# Patient Record
Sex: MALE | Race: BLACK OR AFRICAN AMERICAN | NOT HISPANIC OR LATINO | Employment: FULL TIME | URBAN - METROPOLITAN AREA
[De-identification: names, ages, dates, MRNs, and addresses within clinical notes are randomized per-mention and may not be internally consistent; named-entity substitution may affect disease eponyms.]

---

## 2019-12-18 ENCOUNTER — APPOINTMENT (OUTPATIENT)
Dept: URGENT CARE | Facility: CLINIC | Age: 49
End: 2019-12-18
Payer: MEDICAID

## 2020-11-27 ENCOUNTER — HOSPITAL ENCOUNTER (EMERGENCY)
Facility: MEDICAL CENTER | Age: 50
End: 2020-11-27
Attending: EMERGENCY MEDICINE
Payer: COMMERCIAL

## 2020-11-27 ENCOUNTER — APPOINTMENT (OUTPATIENT)
Dept: RADIOLOGY | Facility: MEDICAL CENTER | Age: 50
End: 2020-11-27
Attending: EMERGENCY MEDICINE
Payer: COMMERCIAL

## 2020-11-27 ENCOUNTER — NON-PROVIDER VISIT (OUTPATIENT)
Dept: OCCUPATIONAL MEDICINE | Facility: CLINIC | Age: 50
End: 2020-11-27
Payer: COMMERCIAL

## 2020-11-27 VITALS
HEIGHT: 71 IN | DIASTOLIC BLOOD PRESSURE: 104 MMHG | TEMPERATURE: 96.8 F | HEART RATE: 70 BPM | OXYGEN SATURATION: 98 % | BODY MASS INDEX: 31.48 KG/M2 | SYSTOLIC BLOOD PRESSURE: 159 MMHG | WEIGHT: 224.87 LBS | RESPIRATION RATE: 18 BRPM

## 2020-11-27 DIAGNOSIS — Z02.83 ENCOUNTER FOR DRUG SCREENING: ICD-10-CM

## 2020-11-27 DIAGNOSIS — S60.222A CONTUSION OF LEFT HAND, INITIAL ENCOUNTER: ICD-10-CM

## 2020-11-27 LAB
AMP AMPHETAMINE: NORMAL
BAR BARBITURATES: NORMAL
BREATH ALCOHOL COMMENT: NORMAL
BZO BENZODIAZEPINES: NORMAL
COC COCAINE: NORMAL
INT CON NEG: NORMAL
INT CON POS: NORMAL
MDMA ECSTASY: NORMAL
MET METHAMPHETAMINES: NORMAL
MTD METHADONE: NORMAL
OPI OPIATES: NORMAL
OXY OXYCODONE: NORMAL
PCP PHENCYCLIDINE: NORMAL
POC BREATHALIZER: 0 PERCENT (ref 0–0.01)
POC URINE DRUG SCREEN OCDRS: NORMAL
THC: NORMAL

## 2020-11-27 PROCEDURE — 80305 DRUG TEST PRSMV DIR OPT OBS: CPT | Performed by: PREVENTIVE MEDICINE

## 2020-11-27 PROCEDURE — 73130 X-RAY EXAM OF HAND: CPT | Mod: LT

## 2020-11-27 PROCEDURE — 99283 EMERGENCY DEPT VISIT LOW MDM: CPT

## 2020-11-27 PROCEDURE — 8899 PR URINE 11 PANEL - AFTER HOURS: Performed by: PREVENTIVE MEDICINE

## 2020-11-27 PROCEDURE — 82075 ASSAY OF BREATH ETHANOL: CPT | Performed by: PREVENTIVE MEDICINE

## 2020-11-27 ASSESSMENT — PAIN DESCRIPTION - DESCRIPTORS: DESCRIPTORS: ACHING

## 2020-11-27 NOTE — ED PROVIDER NOTES
ED Provider Note    CHIEF COMPLAINT  Chief Complaint   Patient presents with   • Hand Pain       HPI  Cuate Bravo is a 50 y.o. male who presents with complaint of left hand pain.  The patient states he works at a job where he is utilizing his hand and pushing mechanical objects through a hole and caught his left hand stuck between the mechanical tool and the hole resulting in him hitting his hand.  This happened 2 days ago.  Since that time is increasing pain to his index finger as well as thenar eminence on his left hand.  Denies loss of sensation or strength of his hand.  Seen today by his occupational health provider at the facility and was told to come to the emergency department for evaluation.  He is a right-hand-dominant male.  REVIEW OF SYSTEMS  Pertinent positives include left hand pain  Pertinent negatives include loss of sensation or strength to left upper extremity, left hand  PAST MEDICAL HISTORY  History reviewed. No pertinent past medical history.    FAMILY HISTORY  Family History   Problem Relation Age of Onset   • Other Mother         unknown   • Seizures Father        SOCIAL HISTORY  Social History     Socioeconomic History   • Marital status:      Spouse name: Not on file   • Number of children: Not on file   • Years of education: Not on file   • Highest education level: Not on file   Occupational History   • Not on file   Social Needs   • Financial resource strain: Not on file   • Food insecurity     Worry: Not on file     Inability: Not on file   • Transportation needs     Medical: Not on file     Non-medical: Not on file   Tobacco Use   • Smoking status: Current Every Day Smoker     Packs/day: 0.25     Types: Cigarettes   • Smokeless tobacco: Current User   Substance and Sexual Activity   • Alcohol use: Yes     Alcohol/week: 6.0 oz     Types: 10 Shots of liquor per week     Comment: Occasionally   • Drug use: No   • Sexual activity: Yes     Partners: Female   Lifestyle   •  "Physical activity     Days per week: Not on file     Minutes per session: Not on file   • Stress: Not on file   Relationships   • Social connections     Talks on phone: Not on file     Gets together: Not on file     Attends Buddhism service: Not on file     Active member of club or organization: Not on file     Attends meetings of clubs or organizations: Not on file     Relationship status: Not on file   • Intimate partner violence     Fear of current or ex partner: Not on file     Emotionally abused: Not on file     Physically abused: Not on file     Forced sexual activity: Not on file   Other Topics Concern   • Not on file   Social History Narrative   • Not on file       SURGICAL HISTORY  History reviewed. No pertinent surgical history.    CURRENT MEDICATIONS  Home Medications     Reviewed by Babatunde Ordoñez (Pharmacy Tech) on 11/27/20 at 0849  Med List Status: Complete   Medication Last Dose Status        Patient Omega Taking any Medications                       ALLERGIES  No Known Allergies    PHYSICAL EXAM  VITAL SIGNS: /104   Pulse 70   Temp 36 °C (96.8 °F) (Temporal)   Resp 18   Ht 1.803 m (5' 11\")   Wt 102 kg (224 lb 13.9 oz)   SpO2 98%   BMI 31.36 kg/m²      Constitutional: Well developed, Well nourished, No acute distress, Non-toxic appearance.   Skin: Warm, Dry, No erythema, No rash.   Extremities: Tenderness to the lateral aspect of the extensor surface of the left index finger in the proximal phalanx, slight thenar eminence tenderness in the left upper extremity, cap refill is less than 2 seconds to left upper extremity  Neurologic: Ulnar, median and radial nerve intact sensation strength left upper extremity      RADIOLOGY/PROCEDURES  DX-HAND 3+ LEFT   Final Result      1.  There is no acute displaced fracture of the left hand.            COURSE & MEDICAL DECISION MAKING  Pertinent Labs & Imaging studies reviewed. (See chart for details)  This is a pleasant 50-year-old male " presents with a left hand contusion.  X-rays negative for fracture.  This point cannot complete exclude occult fracture therefore is to follow-up with his Workmen's Compensation for further evaluation.  Admission the patient is to follow-up with Workmen's Compensation for clearance to go back to work.  The patient was discharged with strict return precautions utilize ice, ibuprofen and Aminofen for pain control.      FINAL IMPRESSION     1. Contusion of left hand, initial encounter Active       DISPOSITION:  Patient will be discharged home in stable condition.    FOLLOW UP:  [facility], Emergency Dept  [address]  [facility] [address]  [phone]    If symptoms worsen    [facility] [address]  [address]  [facility] [address]  [phone]  Schedule an appointment as soon as possible for a visit         Electronically signed by: Ander Dumas D.O., 11/27/2020 8:51 AM

## 2020-11-27 NOTE — LETTER
"  FORM C-4:  EMPLOYEE’S CLAIM FOR COMPENSATION/ REPORT OF INITIAL TREATMENT  EMPLOYEE’S CLAIM - PROVIDE ALL INFORMATION REQUESTED   First Name Cuate Last Name Lawrence Birthdate 1970  Sex male Claim Number   Home Address [address]   [facility]             [place] [address]                                   Age  50 y.o. Height  1.803 m (5' 11\") Weight  102 kg (224 lb 13.9 oz) [facility]     Mailing Address [address]  [facility]              [place] [address] Telephone  [phone] (home)  Primary Language Spoken   Insurer  [employer]   [employer] Employee's Occupation (Job Title) When Injury or Occupational Disease Occurred  I+   Employer's Name [employer] [facility] Telephone [phone]    Employer Address [address] [facility] [address]] Zip [address]   Date of Injury  11/26/2020       Hour of Injury  6:30 PM Date Employer Notified  11/26/2020 Last Day of Work after Injury or Occupational Disease  11/27/2020 Supervisor to Whom Injury Reported  Rai   Address or Location of Accident (if applicable) [[facility]]   What were you doing at the time of accident? (if applicable) working on a machine    How did this injury or occupational disease occur? Be specific and answer in detail. Use additional sheet if necessary)  I was working on a machine at [employer] I was pulling material through the machine when I heard a pop then I had pain come therw my hand   If you believe that you have an occupational disease, when did you first have knowledge of the disability and it relationship to your employment? No Witnesses to the Accident  No   Nature of Injury or Occupational Disease  Workers' Compensation Part(s) of Body Injured or Affected  Hand (L), N/A, N/A    I CERTIFY THAT THE ABOVE IS TRUE AND CORRECT TO THE BEST OF MY KNOWLEDGE AND THAT I HAVE PROVIDED THIS INFORMATION IN ORDER TO OBTAIN THE BENEFITS OF [place]).  I HEREBY AUTHORIZE ANY PHYSICIAN, CHIROPRACTOR, SURGEON, PRACTITIONER, OR OTHER PERSON, ANY HOSPITAL, INCLUDING [facility] OR [facility], ANY MEDICAL SERVICE ORGANIZATION, ANY INSURANCE COMPANY, OR OTHER INSTITUTION OR ORGANIZATION TO RELEASE TO EACH OTHER, ANY MEDICAL OR OTHER INFORMATION, INCLUDING BENEFITS PAID OR PAYABLE, PERTINENT TO THIS INJURY OR DISEASE, EXCEPT INFORMATION RELATIVE TO DIAGNOSIS, TREATMENT AND/OR COUNSELING FOR AIDS, PSYCHOLOGICAL CONDITIONS, ALCOHOL OR CONTROLLED SUBSTANCES, FOR WHICH I MUST GIVE SPECIFIC AUTHORIZATION.  A PHOTOSTAT OF THIS AUTHORIZATION SHALL BE AS VALID AS THE ORIGINAL.  Date 11/27/2020       [facility]                Employee’s Signature   THIS REPORT MUST BE COMPLETED AND MAILED WITHIN 3 WORKING DAYS OF TREATMENT   [facility], EMERGENCY DEPT                       Name of Facility [facility]   Date  11/27/2020 Diagnosis  (S60.222A) Contusion of left hand, initial encounter, Active Is there evidence the injured employee was under the influence of alcohol and/or another controlled substance at the time of accident?   Hour  9:53 AM Description of Injury or Disease  Contusion of left hand, initial encounter No   Treatment  Ibuprofen and Tylenol for pain ice as needed  Have you advised the patient to remain off work five days or more?         No   X-Ray Findings  Negative  Comments:Negative hand x-ray If Yes   From Date    To Date      From information given by the employee, together with medical evidence, can you directly connect this injury or occupational disease as job incurred? Yes If No, is employee capable of: Full Duty  No Modified Duty  Yes   Is additional medical care by a physician indicated? Yes  Comments:Follow-up for reevaluation If Modified Duty, Specify any  "Limitations / Restrictions   No use of left hand   Do you know of any previous injury or disease contributing to this condition or occupational disease? No    Date 11/27/2020 Print Doctor’s Name Ramona Dumas certify the employer’s copy of this form was mailed on:   Address [address] [address] [phone] INSURER’S USE ONLY   Provider’s Tax ID Number [id number] Telephone Dept: [phone]    Doctor’s Signature regina-RAMONA Red D.O. Degree  DO      Form C-4 (rev.10/07)                                                                         BRIEF DESCRIPTION OF RIGHTS AND BENEFITS  (Pursuant to [place] 616C.050)    Notice of Injury or Occupational Disease (Incident Report Form C-1): If an injury or occupational disease (OD) arises out of and in the course of employment, you must provide written notice to your employer as soon as practicable, but no later than 7 days after the accident or OD. Your employer shall maintain a sufficient supply of the required forms.    Claim for Compensation (Form C-4): If medical treatment is sought, the form C-4 is available at the place of initial treatment. A completed \"Claim for Compensation\" (Form C-4) must be filed within 90 days after an accident or OD. The treating physician or chiropractor must, within 3 working days after treatment, complete and mail to the employer, the employer's insurer and third-party , the Claim for Compensation.    Medical Treatment: If you require medical treatment for your on-the-job injury or OD, you may be required to select a physician or chiropractor from a list provided by your workers’ compensation insurer, if it has contracted with an Organization for Managed Care (MCO) or Preferred Provider Organization (PPO) or providers of health care. If your employer has not entered into a contract with an MCO or PPO, you may select a physician or chiropractor from the Panel of Physicians and Chiropractors. " Any medical costs related to your industrial injury or OD will be paid by your insurer.    Temporary Total Disability (TTD): If your doctor has certified that you are unable to work for a period of at least 5 consecutive days, or 5 cumulative days in a 20-day period, or places restrictions on you that your employer does not accommodate, you may be entitled to TTD compensation.    Temporary Partial Disability (TPD): If the wage you receive upon reemployment is less than the compensation for TTD to which you are entitled, the insurer may be required to pay you TPD compensation to make up the difference. TPD can only be paid for a maximum of 24 months.    Permanent Partial Disability (PPD): When your medical condition is stable and there is an indication of a PPD as a result of your injury or OD, within 30 days, your insurer must arrange for an evaluation by a rating physician or chiropractor to determine the degree of your PPD. The amount of your PPD award depends on the date of injury, the results of the PPD evaluation and your age and wage.    Permanent Total Disability (PTD): If you are medically certified by a treating physician or chiropractor as permanently and totally disabled and have been granted a PTD status by your insurer, you are entitled to receive monthly benefits not to exceed 66 2/3% of your average monthly wage. The amount of your PTD payments is subject to reduction if you previously received a PPD award.    Vocational Rehabilitation Services: You may be eligible for vocational rehabilitation services if you are unable to return to the job due to a permanent physical impairment or permanent restrictions as a result of your injury or occupational disease.    Transportation and Per Saurabh Reimbursement: You may be eligible for travel expenses and per saurabh associated with medical treatment.    Reopening: You may be able to reopen your claim if your condition worsens after claim closure.     Appeal  Process: If you disagree with a written determination issued by the insurer or the insurer does not respond to your request, you may appeal to the Department of Administration, , by following the instructions contained in your determination letter. You must appeal the determination within 70 days from the date of the determination letter at [address], [address], [place], [place] [address], or [address], [address], [place], [place] [address]. If you disagree with the  decision, you may appeal to the Department of Administration, . You must file your appeal within 30 days from the date of the  decision letter at [address], [address], [place], [place] [address], or [address], [address], [place], [place] [address]. If you disagree with a decision of an , you may file a petition for judicial review with the District Court. You must do so within 30 days of the Appeal Officer’s decision. You may be represented by an  at your own expense or you may contact the [facility] for possible representation.    [place]  for Injured Workers (NAIW): If you disagree with a  decision, you may request that NAIW represent you without charge at an  Hearing. For information regarding denial of benefits, you may contact the [facility] at: [address], [address], [place] [address], [phone], or [address], [address], [place] [address], [phone]    To File a Complaint with the Division: If you wish to file a complaint with the  of the Division of Industrial Relations (DIR),  please contact the Workers’ Compensation Section, [address] [facility] [place] [address], [place], [place] [address], telephone (732) [phone], or [address], [address], [place], [place] [address], telephone [phone].    For assistance with Workers’ Compensation  Issues: You may contact the Office of the Governor Consumer Health Assistance, [address] [place] [address], [place] [address], [place] [address], Toll Free 1-704.403.7151, Web site: http://govcha.[facility].nv., E-mail jennifer@[facility].nv.  D-2 (rev. 06/18)              __________________________________________________________________                                    __11/27/2020____            Employee Name / Signature                                                                                                                            Date

## 2020-11-27 NOTE — ED TRIAGE NOTES
"Pt presents complaining of left hand pain.  This is a work related injured incurred 2 days ago.  He states \"crushing\" his left hand in a winding machine.   Chief Complaint   Patient presents with   • Hand Pain     /104   Pulse 70   Temp 36 °C (96.8 °F) (Temporal)   Resp 18   Ht 1.803 m (5' 11\")   Wt 102 kg (224 lb 13.9 oz)   SpO2 98%   BMI 31.36 kg/m²       "

## 2020-11-27 NOTE — ED NOTES
ERP at bedside. Pt agrees with plan of care discussed by ERP. AIDET acknowledged with patient. Raysa in low position, side rail up for pt safety. Call light within reach. Will continue to monitor.

## 2020-11-27 NOTE — LETTER
"  FORM C-4:  EMPLOYEE’S CLAIM FOR COMPENSATION/ REPORT OF INITIAL TREATMENT  EMPLOYEE’S CLAIM - PROVIDE ALL INFORMATION REQUESTED   First Name Cuate Last Name Lawrence Birthdate 1970  Sex male Claim Number   Home Address [address]   [facility]             [place] [address]                                   Age  50 y.o. Height  1.803 m (5' 11\") Weight  102 kg (224 lb 13.9 oz) [facility]  [id number]   Mailing Address [address]  [facility]              Zip [address] Telephone  [phone] (home)  Primary Language Spoken   Insurer  *** Third Party   [employer] Employee's Occupation (Job Title) When Injury or Occupational Disease Occurred     Employer's Name [employer] Telephone [phone]    Employer Address [address] [employer] [facility] [29] Zip [address]   Date of Injury  11/26/2020       Hour of Injury  6:30 PM Date Employer Notified  11/26/2020 Last Day of Work after Injury or Occupational Disease  11/27/2020 Supervisor to Whom Injury Reported  Rai   Address or Location of Accident (if applicable) [[facility]]   What were you doing at the time of accident? (if applicable) working on a machine    How did this injury or occupational disease occur? Be specific and answer in detail. Use additional sheet if necessary)  I was working on a machine at [employer] I was pulling material through the machine when I heard a pop then I had pain come therw my hand   If you believe that you have an occupational disease, when did you first have knowledge of the disability and it relationship to your employment? No Witnesses to the Accident  No   Nature of Injury or Occupational Disease  Workers' Compensation Part(s) of Body Injured or Affected  Hand (L), N/A, N/A    I CERTIFY THAT THE ABOVE IS TRUE AND CORRECT TO THE BEST OF MY KNOWLEDGE AND THAT I HAVE PROVIDED THIS INFORMATION IN ORDER TO OBTAIN THE BENEFITS OF [place]CC[place] IN[place]IVE OR CHAPTER 617 OF [place]).  I HEREBY AUTHORIZE ANY PHYSICIAN, CHIROPRACTOR, SURGEON, PRACTITIONER, OR OTHER PERSON, ANY HOSPITAL, INCLUDING [facility] OR [facility], ANY MEDICAL SERVICE ORGANIZATION, ANY INSURANCE COMPANY, OR OTHER INSTITUTION OR ORGANIZATION TO RELEASE TO EACH OTHER, ANY MEDICAL OR OTHER INFORMATION, INCLUDING BENEFITS PAID OR PAYABLE, PERTINENT TO THIS INJURY OR DISEASE, EXCEPT INFORMATION RELATIVE TO DIAGNOSIS, TREATMENT AND/OR COUNSELING FOR AIDS, PSYCHOLOGICAL CONDITIONS, ALCOHOL OR CONTROLLED SUBSTANCES, FOR WHICH I MUST GIVE SPECIFIC AUTHORIZATION.  A PHOTOSTAT OF THIS AUTHORIZATION SHALL BE AS VALID AS THE ORIGINAL.  Date                                      Place                                                                             Employee’s Signature   THIS REPORT MUST BE COMPLETED AND MAILED WITHIN 3 WORKING DAYS OF TREATMENT   Place [facility], EMERGENCY DEPT                       Name of Facility [facility]   Date  11/27/2020 Diagnosis  (S60.222A) Contusion of left hand, initial encounter, Active Is there evidence the injured employee was under the influence of alcohol and/or another controlled substance at the time of accident?   Hour  9:44 AM Description of Injury or Disease  Contusion of left hand, initial encounter     Treatment     Have you advised the patient to remain off work five days or more?             X-Ray Findings    If Yes   From Date    To Date      From information given by the employee, together with medical evidence, can you directly connect this injury or occupational disease as job incurred?   If No, is employee capable of: Full Duty    Modified Duty      Is additional medical care by a physician indicated?   If Modified Duty, Specify any Limitations / Restrictions       Do you know of any previous injury or disease  "contributing to this condition or occupational disease?      Date 11/27/2020 Print Doctor’s Name Alesia Ander APARICIO certify the employer’s copy of this form was mailed on:   Address [address] [id number] [phone] INSURER’S USE ONLY   Provider’s Tax ID Number [id number] Telephone Dept: [phone]    Doctor’s Signature regina-ANDER Red D.O. Degree  DO      Form C-4 (rev.10/07)                                                                         BRIEF DESCRIPTION OF RIGHTS AND BENEFITS  (Pursuant to [place]RS 616C.050)    Notice of Injury or Occupational Disease (Incident Report Form C-1): If an injury or occupational disease (OD) arises out of and in the course of employment, you must provide written notice to your employer as soon as practicable, but no later than 7 days after the accident or OD. Your employer shall maintain a sufficient supply of the required forms.    Claim for Compensation (Form C-4): If medical treatment is sought, the form C-4 is available at the place of initial treatment. A completed \"Claim for Compensation\" (Form C-4) must be filed within 90 days after an accident or OD. The treating physician or chiropractor must, within 3 working days after treatment, complete and mail to the employer, the employer's insurer and third-party , the Claim for Compensation.    Medical Treatment: If you require medical treatment for your on-the-job injury or OD, you may be required to select a physician or chiropractor from a list provided by your workers’ compensation insurer, if it has contracted with an Organization for Managed Care (MCO) or Preferred Provider Organization (PPO) or providers of health care. If your employer has not entered into a contract with an MCO or PPO, you may select a physician or chiropractor from the Panel of Physicians and Chiropractors. Any medical costs related to your industrial injury or OD will be paid by your " insurer.    Temporary Total Disability (TTD): If your doctor has certified that you are unable to work for a period of at least 5 consecutive days, or 5 cumulative days in a 20-day period, or places restrictions on you that your employer does not accommodate, you may be entitled to TTD compensation.    Temporary Partial Disability (TPD): If the wage you receive upon reemployment is less than the compensation for TTD to which you are entitled, the insurer may be required to pay you TPD compensation to make up the difference. TPD can only be paid for a maximum of 24 months.    Permanent Partial Disability (PPD): When your medical condition is stable and there is an indication of a PPD as a result of your injury or OD, within 30 days, your insurer must arrange for an evaluation by a rating physician or chiropractor to determine the degree of your PPD. The amount of your PPD award depends on the date of injury, the results of the PPD evaluation and your age and wage.    Permanent Total Disability (PTD): If you are medically certified by a treating physician or chiropractor as permanently and totally disabled and have been granted a PTD status by your insurer, you are entitled to receive monthly benefits not to exceed 66 2/3% of your average monthly wage. The amount of your PTD payments is subject to reduction if you previously received a PPD award.    Vocational Rehabilitation Services: You may be eligible for vocational rehabilitation services if you are unable to return to the job due to a permanent physical impairment or permanent restrictions as a result of your injury or occupational disease.    Transportation and Per Saurabh Reimbursement: You may be eligible for travel expenses and per saurabh associated with medical treatment.    Reopening: You may be able to reopen your claim if your condition worsens after claim closure.     Appeal Process: If you disagree with a written determination issued by the insurer or the  insurer does not respond to your request, you may appeal to the Department of Administration, , by following the instructions contained in your determination letter. You must appeal the determination within 70 days from the date of the determination letter at [address], [address], [place], [place] [address], or [address], [address], [place], [place] [address]. If you disagree with the  decision, you may appeal to the Department of Administration, . You must file your appeal within 30 days from the date of the  decision letter at [address], [address], [place], [place] [address], or [address], [address], [place], [place] [address]. If you disagree with a decision of an , you may file a petition for judicial review with the District Court. You must do so within 30 days of the Appeal Officer’s decision. You may be represented by an  at your own expense or you may contact the [facility] for possible representation.    [place]  for Injured Workers (NAIW): If you disagree with a  decision, you may request that NAIW represent you without charge at an  Hearing. For information regarding denial of benefits, you may contact the [facility] at: [address], [address], [place] [address], [phone]) [phone], or [address], [address], [place] [address], [phone]    To File a Complaint with the Division: If you wish to file a complaint with the  of the Division of Industrial Relations (DIR),  please contact the Workers’ Compensation Section, [address], [address], [place], [place] [address], telephone (338) [phone], or [address], [facility] [address], [place], [place] [address], telephone [phone]) [phone].    For assistance with Workers’ Compensation Issues: You may contact the Office of the Governor Consumer Health Assistance, [address]  [facility], [place], [place] [address], Toll Free 1-947.315.3460, Web site: http://[email or web address]., E-mail [email or web address].  D-2 (rev. 06/18)              __________________________________________________________________                                    _________________            Employee Name / Signature                                                                                                                            Date

## 2020-12-02 ENCOUNTER — OCCUPATIONAL MEDICINE (OUTPATIENT)
Dept: OCCUPATIONAL MEDICINE | Facility: CLINIC | Age: 50
End: 2020-12-02
Payer: COMMERCIAL

## 2020-12-02 VITALS
HEART RATE: 80 BPM | DIASTOLIC BLOOD PRESSURE: 86 MMHG | BODY MASS INDEX: 32.2 KG/M2 | HEIGHT: 71 IN | TEMPERATURE: 99.1 F | WEIGHT: 230 LBS | SYSTOLIC BLOOD PRESSURE: 122 MMHG | RESPIRATION RATE: 14 BRPM | OXYGEN SATURATION: 98 %

## 2020-12-02 DIAGNOSIS — S60.222D CONTUSION OF LEFT HAND, SUBSEQUENT ENCOUNTER: ICD-10-CM

## 2020-12-02 DIAGNOSIS — M20.002 DEVIATION OF FINGER OF LEFT HAND: ICD-10-CM

## 2020-12-02 PROCEDURE — 99213 OFFICE O/P EST LOW 20 MIN: CPT | Performed by: NURSE PRACTITIONER

## 2020-12-02 ASSESSMENT — PAIN SCALES - GENERAL: PAINLEVEL: 8=MODERATE-SEVERE PAIN

## 2020-12-02 ASSESSMENT — ENCOUNTER SYMPTOMS
MYALGIAS: 1
CARDIOVASCULAR NEGATIVE: 1
RESPIRATORY NEGATIVE: 1
CONSTITUTIONAL NEGATIVE: 1
PSYCHIATRIC NEGATIVE: 1
NEUROLOGICAL NEGATIVE: 1

## 2020-12-02 NOTE — PROGRESS NOTES
"Subjective:      Cuate Bravo is a 50 y.o. male who presents with Follow-Up ( DOI 11/26/2020 - Hand - Same - RM 17)      Cuate Bravo is a 50 y.o. male who presents with complaint of left hand pain.  The patient states he works at a job where he is utilizing his hand and pushing mechanical objects through a hole and caught his left hand stuck between the mechanical tool and the hole resulting in him hitting his hand.  He reports no improvement of symptoms.  He states that pain is radiating, constant with any gripping, and achy in nature.  He states that he does have some swelling at the end of the day.  He denies numbness, tingling, or overall decreased strength.  He has been taking aspirin with mild to moderate relief of symptoms.  He notes a previous injury in the same digit lower back, which took a very long time to heal.  He has not been back to work since the incident.  Due to the nature of injury feel is warranted patient follow-up with orthopedic hand surgeon for further evaluation and management.  Will also place order for hand therapy at this visit.  Plan of care discussed with patient.     HPI    Review of Systems   Constitutional: Negative.    Respiratory: Negative.    Cardiovascular: Negative.    Musculoskeletal: Positive for joint pain and myalgias.   Skin: Negative.    Neurological: Negative.    Psychiatric/Behavioral: Negative.         ROS: All systems were reviewed on intake form, form was reviewed and signed. See scanned documents in media. Pertinent positives and negatives included in HPI.    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     Appearance: Normal appearance. He is not ill-appearing.   Cardiovascular:      Rate and Rhythm: Normal rate and regular rhythm.      Pulses: Normal pulses.   Pulmonary:      Effort: Pulmonary effort is normal.   Musculoskeletal: Normal range of motion.         General: Tenderness, deformity and signs of injury present. No swelling.   Skin:     General: Skin is warm and dry.      Capillary Refill: Capillary refill takes less than 2 seconds.      Findings: No bruising or erythema.   Neurological:      General: No focal deficit present.      Mental Status: He is alert and oriented to person, place, and time.      Cranial Nerves: No cranial nerve deficit.      Sensory: No sensory deficit.      Motor: Weakness present.      Gait: Gait normal.   Psychiatric:         Mood and Affect: Mood normal.         Behavior: Behavior normal.         Left hand: Positive moderate tenderness to the lateral aspect of the extensor surface of the left index finger in the proximal phalanx, slight thenar eminence tenderness in the left upper extremity, cap refill is less than 2 seconds to left upper extremity.  Negative edema, erythema, or severe warmth noted at the joint.   strength 3/5.  Distal neurovascular sensation intact.       Assessment/Plan:        1. Contusion of left hand, subsequent encounter    - REFERRAL TO HAND SURGERY  - REFERRAL TO OCCUPATIONAL THERAPY    2. Deviation of finger of left hand    Follow-up in 3 weeks, if not seen by hand surgery   Restricted duty, per hand surgery   Hand surgery referral placed, transfer care   Hand therapy referral placed   Continue with aspirin as needed for symptoms   Continue with ice and elevation as needed for swelling   Continue with gentle range of motion and stretching exercises as tolerated

## 2020-12-02 NOTE — LETTER
[address],   [address] [place] [address]  Phone:  [phone] - Fax:  [phone]   [facility] Progress Report and Disability Certification  Date of Service: 12/2/2020   No Show:  No  Date / Time of Next Visit:   Discharged / Care Transfer to Hand Surgery    Claim Information   Patient Name: Cuate Bravo  Claim Number:     Employer: [employer]  Date of Injury: 11/26/2020     Insurer / TPA: [employer]  ID / SSN:     Occupation: I+  Diagnosis: The encounter diagnosis was Contusion of left hand, subsequent encounter.    Medical Information   Related to Industrial Injury? Yes    Subjective Complaints:  Cuate Bravo is a 50 y.o. male who presents with complaint of left hand pain.  The patient states he works at a job where he is utilizing his hand and pushing mechanical objects through a hole and caught his left hand stuck between the mechanical tool and the hole resulting in him hitting his hand.  He reports no improvement of symptoms.  He states that pain is radiating, constant with any gripping, and achy in nature.  He states that he does have some swelling at the end of the day.  He denies numbness, tingling, or overall decreased strength.  He has been taking aspirin with mild to moderate relief of symptoms.  He notes a previous injury in the same digit lower back, which took a very long time to heal.  He has not been back to work since the incident.  Due to the nature of injury feel is warranted patient follow-up with orthopedic hand surgeon for further evaluation and management.  Will also place order for hand therapy at this visit.  Plan of care discussed with patient.   Objective Findings: Left hand: Positive moderate tenderness to the lateral aspect of the extensor surface of the left index finger in the proximal phalanx, slight thenar eminence tenderness in the left upper  extremity, cap refill is less than 2 seconds to left upper extremity.  Negative edema, erythema, or severe warmth noted at the joint.   strength 3/5.  Distal neurovascular sensation intact.   Pre-Existing Condition(s):     Assessment:   Condition Same    Status: Discharged / Care Transfer  Permanent Disability:No    Plan: OTTransfer Care    Diagnostics:      Comments:  Follow-up in 3 weeks, if not seen by hand surgery  Restricted duty, per hand surgery  Hand surgery referral placed, transfer care  Hand therapy referral placed  Continue with aspirin as needed for symptoms  Continue with ice and elevation as needed f  or swelling  Continue with gentle range of motion and stretching exercises as tolerated    Disability Information   Status: Released to Restricted Duty    From:  12/2/2020  Through:   Restrictions are: Temporary   Physical Restrictions   Sitting:    Standing:    Stooping:    Bending:      Squatting:    Walking:    Climbing:    Pushing:      Pulling:    Other:    Reaching Above Shoulder (L):   Reaching Above Shoulder (R):       Reaching Below Shoulder (L):    Reaching Below Shoulder (R):      Not to exceed Weight Limits   Carrying(hrs):   Weight Limit(lb): < or = to 25 pounds  Comments:Left hand only  Lifting(hrs):   Weight  Limit(lb): < or = to 25 pounds  Comments:Left hand only    Comments:      Repetitive Actions   Hands: i.e. Fine Manipulations from Grasping: < or = to 2 hrs/day   Feet: i.e. Operating Foot Controls:     Driving / Operate Machinery:     Provider Name:   ALFREDO Monte Physician Signature:  Physician Name:     Clinic Name / Location: [address] [place] [address] Clinic Phone Number: Dept: [phone]   Appointment Time: 8:45 Am Visit Start Time: 8:30 AM   Check-In Time:  8:13 Am Visit Discharge Time: 9:12 am    Original-Treating Physician or Chiropractor    Page 2-Insurer/TPA    Page 3-Employer    Page 4-Employee

## 2022-08-11 ENCOUNTER — OCCUPATIONAL MEDICINE (OUTPATIENT)
Dept: OCCUPATIONAL MEDICINE | Facility: CLINIC | Age: 52
End: 2022-08-11
Payer: COMMERCIAL

## 2022-08-11 VITALS
BODY MASS INDEX: 27.86 KG/M2 | HEIGHT: 71 IN | HEART RATE: 94 BPM | SYSTOLIC BLOOD PRESSURE: 168 MMHG | TEMPERATURE: 98.3 F | DIASTOLIC BLOOD PRESSURE: 90 MMHG | WEIGHT: 199 LBS | OXYGEN SATURATION: 98 %

## 2022-08-11 DIAGNOSIS — M54.16 RADICULOPATHY OF LUMBAR REGION: ICD-10-CM

## 2022-08-11 DIAGNOSIS — S39.012D STRAIN OF LUMBAR REGION, SUBSEQUENT ENCOUNTER: ICD-10-CM

## 2022-08-11 PROCEDURE — 99213 OFFICE O/P EST LOW 20 MIN: CPT | Performed by: NURSE PRACTITIONER

## 2022-08-11 RX ORDER — NAPROXEN 375 MG/1
TABLET ORAL
COMMUNITY
Start: 2022-07-29 | End: 2022-09-09 | Stop reason: SDUPTHER

## 2022-08-11 RX ORDER — OXYCODONE HYDROCHLORIDE AND ACETAMINOPHEN 5; 325 MG/1; MG/1
TABLET ORAL
COMMUNITY
Start: 2022-07-29 | End: 2023-06-28

## 2022-08-11 RX ORDER — OMEPRAZOLE 40 MG/1
CAPSULE, DELAYED RELEASE ORAL
COMMUNITY
Start: 2022-07-29 | End: 2023-06-28

## 2022-08-11 RX ORDER — METHOCARBAMOL 750 MG/1
TABLET, FILM COATED ORAL
COMMUNITY
Start: 2022-07-29 | End: 2022-09-09 | Stop reason: SDUPTHER

## 2022-08-11 NOTE — LETTER
[address],   [address] [place] [address]  Phone:  [phone] - Fax:  [phone]   Occupational [facility] Progress Report and Disability Certification  Date of Service: 8/11/2022   No Show:  No  Date / Time of Next Visit: 8/19/2022 @10:15am   Claim Information   Patient Name: Cuate Bravo  Claim Number:     Employer: [employer]  Date of Injury: 7/28/2022     Insurer / TPA:   JESUS ID / SSN:     Occupation:   MAINTANCE Diagnosis: Diagnoses of Strain of lumbar region, subsequent encounter and Radiculopathy of lumbar region were pertinent to this visit.    Medical Information   Related to Industrial Injury?   Comments:Indeterminate, no mechanism of injury    Subjective Complaints:  DOI 7/28/22: STEPHANIE: Patient states he tripped over a piece of metal at work.  He states he was seen at [facility].  He has had no improvement of symptoms.  Pain is intense, difficulty sitting, standing, walking, and pain is constant.  He states that his leg feels numb down to his knee and he is pooped himself x2 since the incident.  He has not pertinent negatives. He is not having any relief from the medication prescribed at [facility].  MRI ordered due to severity of symptoms.  Patient reports back injury approximately 10 years ago.  Discussed light duty restrictions, states he is not sure if he could do them because he is in so much pain.  He has not been back to work since the incident. Plan of care discussed with patient.   Objective Findings: Lumbar: No gross deformity noted.  Moderate tenderness to paraspinal musculature L3-S1 and right SI joint.  Moderately  decreased flexion or rotation.  Straight leg test  positive on the right, negative on left. Cranial nerves grossly intact.  Achilles and patellar reflexes 2+ bilaterally.   Sensation intact. No gait abnormalities, slow and steady.     Pre-Existing  Condition(s):     Assessment:   Condition Same    Status: Discharged / Care Transfer  Permanent Disability:No    Plan: Medication (NOT at Work)DiagnosticsTransfer Care    Diagnostics: MRI    Comments:  Follow-up in 1 week, unless seen by physiatry  Restricted duty, per physiatry  Physiatry referral placed, transfer care  MRI ordered under medical necessity only  Recommend continue with Robaxin and naproxen as prescribed via [facility]  Recommend ice/heat application, OTC topical ointments i.e. Tiger balm, Voltaren gel, or Biofreeze, and gentle range of motion stretching exercises as tolerated  Follow-up with pcp to address High Blood pressure     Disability Information   Status: Released to Restricted Duty    From:  2022  Through: 2022 Restrictions are: Temporary   Physical Restrictions   Sitting:    Standing:    Stoopin hrs/day Bendin hrs/day   Squattin hrs/day Walking:    Climbing:    Pushin hrs/day   Pullin hrs/day Other:    Reaching Above Shoulder (L):   Reaching Above Shoulder (R):       Reaching Below Shoulder (L):    Reaching Below Shoulder (R):      Not to exceed Weight Limits   Carrying(hrs):   Weight Limit(lb): < or = to 25 pounds Lifting(hrs):   Weight  Limit(lb): < or = to 25 pounds   Comments: Recommend rotating sitting, standing, and walking as needed for comfort    Repetitive Actions   Hands: i.e. Fine Manipulations from Grasping:     Feet: i.e. Operating Foot Controls:     Driving / Operate Machinery:     Health Care Provider’s Original or Electronic Signature  ALFREDO Monte Health Care Provider’s Original or Electronic Signature    Leonardo Pappas MD         Clinic Name / Location: [address],   [address]  [place] [address] Clinic Phone Number: Dept: [phone]   Appointment Time: 10:45 Am Visit Start Time: 10:41 AM   Check-In Time:  9:54 Am Visit Discharge Time:  1132AM   Original-Treating  Physician or Chiropractor    Page 2-Insurer/TPA    Page 3-Employer    Page 4-Employee

## 2022-08-11 NOTE — PROGRESS NOTES
"Subjective:     Cuate Bravo is a 52 y.o. male who presents for Follow-Up (DOI 7/28/22 - Lower Back - [facility])      DOI 7/28/22: STEPHANIE: Patient states he tripped over a piece of metal at work.  He states he was seen at [facility].  He has had no improvement of symptoms.  Pain is intense, difficulty sitting, standing, walking, and pain is constant.  He states that his leg feels numb down to his knee and he is pooped himself x2 since the incident.  He has not pertinent negatives. He is not having any relief from the medication prescribed at [facility].  MRI ordered due to severity of symptoms.  Patient reports back injury approximately 10 years ago.  Discussed light duty restrictions, states he is not sure if he could do them because he is in so much pain.  He has not been back to work since the incident. Plan of care discussed with patient.  Symptoms are grossly out of proportions given the nature of the injury.   ROS: All systems were reviewed on intake form, form was reviewed and signed. See scanned documents in media. Pertinent positives and negatives included in HPI.    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  Moderate tenderness to paraspinal musculature L3-S1 and right SI joint.  Moderately  decreased flexion or rotation.  Straight leg test  positive on the right, negative on left. Cranial nerves grossly intact.  Achilles and patellar reflexes 2+ bilaterally.   Sensation intact. No gait abnormalities, slow and " steady.      Assessment/Plan:       1. Strain of lumbar region, subsequent encounter  - MR-LUMBAR SPINE-W/O; Future  - Referral to Radiology  - Referral to Pain Clinic    2. Radiculopathy of lumbar region  - MR-LUMBAR SPINE-W/O; Future  - Referral to Radiology  - Referral to Pain Clinic    Other orders  - oxyCODONE-acetaminophen (PERCOCET) 5-325 MG Tab  - omeprazole (PRILOSEC) 40 MG delayed-release capsule  - naproxen (NAPROSYN) 375 MG Tab  - methocarbamol (ROBAXIN) 750 MG Tab    Released to Restricted Duty FROM 8/11/2022 TO 8/18/2022  Recommend rotating sitting, standing, and walking as needed for comfort  Follow-up in 1 week, unless seen by physiatry  Restricted duty, per physiatry  Physiatry referral placed, transfer care  MRI ordered under medical necessity only  Recommend continue with Robaxin and naproxen as prescribed via [facility]  Recommend ice/heat application, OTC topical ointments i.e. Tiger balm, Voltaren gel, or Biofreeze, and gentle range of motion stretching exercises as tolerated  Follow-up with pcp to address High Blood pressure     Differential diagnosis, natural history, supportive care, and indications for immediate follow-up discussed.    Approximately 25 minutes were spent in reviewing notes, preparing for visit, obtaining history, exam and evaluation, patient counseling/education and post visit documentation/orders.

## 2022-09-09 ENCOUNTER — OCCUPATIONAL MEDICINE (OUTPATIENT)
Dept: OCCUPATIONAL MEDICINE | Facility: CLINIC | Age: 52
End: 2022-09-09
Payer: COMMERCIAL

## 2022-09-09 VITALS
HEART RATE: 92 BPM | OXYGEN SATURATION: 98 % | HEIGHT: 71 IN | SYSTOLIC BLOOD PRESSURE: 128 MMHG | BODY MASS INDEX: 29.4 KG/M2 | DIASTOLIC BLOOD PRESSURE: 86 MMHG | WEIGHT: 210 LBS | RESPIRATION RATE: 16 BRPM

## 2022-09-09 DIAGNOSIS — S39.012D STRAIN OF LUMBAR REGION, SUBSEQUENT ENCOUNTER: ICD-10-CM

## 2022-09-09 DIAGNOSIS — M54.16 RADICULOPATHY OF LUMBAR REGION: ICD-10-CM

## 2022-09-09 PROCEDURE — 99213 OFFICE O/P EST LOW 20 MIN: CPT | Performed by: NURSE PRACTITIONER

## 2022-09-09 RX ORDER — METHOCARBAMOL 750 MG/1
750 TABLET, FILM COATED ORAL 3 TIMES DAILY
Qty: 120 TABLET | Refills: 0 | Status: SHIPPED | OUTPATIENT
Start: 2022-09-09 | End: 2023-06-28

## 2022-09-09 RX ORDER — NAPROXEN 500 MG/1
500 TABLET ORAL 2 TIMES DAILY WITH MEALS
Qty: 60 TABLET | Refills: 0 | Status: SHIPPED | OUTPATIENT
Start: 2022-09-09 | End: 2023-06-28

## 2022-09-09 ASSESSMENT — ENCOUNTER SYMPTOMS
MYALGIAS: 1
WEAKNESS: 0
SENSORY CHANGE: 1
CONSTITUTIONAL NEGATIVE: 1
TINGLING: 1
PSYCHIATRIC NEGATIVE: 1
CARDIOVASCULAR NEGATIVE: 1
RESPIRATORY NEGATIVE: 1
BACK PAIN: 1

## 2022-09-09 ASSESSMENT — PAIN SCALES - GENERAL: PAINLEVEL: 10=SEVERE PAIN

## 2022-09-09 NOTE — PROGRESS NOTES
"Subjective:     Cuate Bravo is a 52 y.o. male who presents for Follow-Up ([facility] 16/)      DOI 7/28/22: STEPHANIE: Patient states he tripped over a piece of metal at work.  Symptoms are unchanged.  Pain is difficulty sitting, standing, walking, and pain is constant.  He states that his leg feels numb down to his knee and he is pooped himself x2 since the incident.  He has not pertinent negatives.  He states that the Robaxin and naproxen help somewhat with symptoms but nothing completely takes everything away.  Patient reports back injury approximately 10 years ago.  MRI results reviewed with patient.  Patient states he missed his last appointment per his  he was told not to follow-up.  Discussed importance of keeping follow-up appointments from Worker's Comp.  He verbalized his understanding.  He states that missing the appointment has caused him pay and he is frustrated.  Patient has obtained an .  Physiatry referral currently pending.  Plan of care discussed with patient.    Review of Systems   Constitutional: Negative.    Respiratory: Negative.     Cardiovascular: Negative.    Musculoskeletal:  Positive for back pain and myalgias.   Skin: Negative.    Neurological:  Positive for tingling and sensory change. Negative for weakness.   Psychiatric/Behavioral: Negative.       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 Appears well-developed and well-nourished.   Cardiovascular: Normal rate.    Pulmonary/Chest: Effort normal. No respiratory distress.   Neurological: Patient is alert and oriented to person, place, and time.   Skin: Skin is warm and dry.   Psychiatric: Normal mood and affect. Behavior is normal.     Lumbar: No gross deformity noted.  Moderate tenderness to " paraspinal musculature L3-S1 and right SI joint.  Moderately  decreased flexion or rotation.  Straight leg test  positive on the right, negative on left. Cranial nerves grossly intact.  Achilles and patellar reflexes 2+ bilaterally.   Sensation intact. No gait abnormalities, slow and steady.      MRI of the lumbar 9/2/22:  Per comparison 2/24/2021:  Impression:  1.  Increase in size of left-sided disc protrusion with annular at L4-L5 resulting in mild central canal stenosis and mild bilateral neural foraminal narrowing.  2.  Mild bilateral neural foraminal narrowing L5-S1, not significantly changed.    Assessment/Plan:       1. Strain of lumbar region, subsequent encounter  - naproxen (NAPROSYN) 500 MG Tab; Take 1 Tablet by mouth 2 times a day with meals.  Dispense: 60 Tablet; Refill: 0  - methocarbamol (ROBAXIN) 750 MG Tab; Take 1 Tablet by mouth 3 times a day.  Dispense: 120 Tablet; Refill: 0    2. Radiculopathy of lumbar region  - naproxen (NAPROSYN) 500 MG Tab; Take 1 Tablet by mouth 2 times a day with meals.  Dispense: 60 Tablet; Refill: 0  - methocarbamol (ROBAXIN) 750 MG Tab; Take 1 Tablet by mouth 3 times a day.  Dispense: 120 Tablet; Refill: 0    Released to Restricted Duty FROM 9/9/2022 TO 9/23/2022  Recommend rotating sitting, standing, and walking as needed for comfort    Follow-up in 2 weeks, unless seen by physiatry  Restricted duty, per physiatry  Physiatry referral placed, transfer care  MRI ordered under medical necessity only, results reviewed with patient  Recommend continue with Robaxin and naproxen as prescribed  Recommend ice/heat application, OTC topical ointments i.e. Tiger balm, Voltaren gel, or Biofreeze, and gentle range of motion stretching exercises as tolerated    Differential diagnosis, natural history, supportive care, and indications for immediate follow-up discussed.    Approximately 25 minutes was spent in preparing for visit, obtaining history, exam and evaluation, patient  counseling/education and post visit documentation/orders.

## 2022-09-09 NOTE — LETTER
[address],   [address] [place] [address]  Phone:  [phone] - Fax:  [phone]   Occupational Health [facility] Progress Report and Disability Certification  Date of Service: 9/9/2022   No Show:  No  Date / Time of Next Visit: 9/23/2022@   Claim Information   Patient Name: Cuate Bravo  Claim Number:     Employer: [employer]  Date of Injury: 7/28/2022     Insurer / TPA: Sj  ID / SSN:     Occupation:   Diagnosis: Diagnoses of Strain of lumbar region, subsequent encounter and Radiculopathy of lumbar region were pertinent to this visit.    Medical Information   Related to Industrial Injury?   Comments:Indeterminate    Subjective Complaints:  DOI 7/28/22: STEPHANIE: Patient states he tripped over a piece of metal at work.  Symptoms are unchanged.  Pain is difficulty sitting, standing, walking, and pain is constant.  He states that his leg feels numb down to his knee and he is pooped himself x2 since the incident.  He has not pertinent negatives.  He states that the Robaxin and naproxen help somewhat with symptoms but nothing completely takes everything away.  Patient reports back injury approximately 10 years ago.  MRI results reviewed with patient.  Patient states he missed his last appointment per his  he was told not to follow-up.  Discussed importance of keeping follow-up appointments from Worker's Comp.  He verbalized his understanding.  He states that missing the appointment has caused him pay and he is frustrated.  Patient has obtained an .  Physiatry referral currently pending.  Plan of care discussed with patient.   Objective Findings: Lumbar: No gross deformity noted.  Moderate tenderness to paraspinal musculature L3-S1 and right SI joint.  Moderately  decreased flexion or rotation.  Straight leg test  positive on the right, negative on left. Cranial nerves grossly intact.  Achilles and patellar reflexes  2+ bilaterally.   Sensation intact. No gait abnormalities, slow and steady.      MRI of the lumbar 22:  Per comparison 2021:  Impression:  1.  Increase in size of left-sided disc protrusion with annular at L4-L5 resulting in mild central canal stenosis and mild bilateral neural foraminal narrowing.  2.  Mild bilateral neural foraminal narrowing L5-S1, not significantly changed.   Pre-Existing Condition(s):     Assessment:   Condition Same    Status: Discharged / Care Transfer  Permanent Disability:No    Plan: Transfer CareMedicationMedication (NOT at Work)    Diagnostics:      Comments:  Follow-up in 2 weeks, unless seen by physiatry  Restricted duty, per physiatry  Physiatry referral placed, transfer care  MRI ordered under medical necessity only, results reviewed with patient  Recommend continue with Robaxin and naproxen as prescribed  Recommend ice/heat application, OTC topical ointments i.e. Tiger balm, Voltaren gel, or Biofreeze, and gentle range of motion stretching exercises as tolerated    Disability Information   Status: Released to Restricted Duty    From:  2022  Through: 2022 Restrictions are: Temporary   Physical Restrictions   Sitting:    Standing:    Stooping:  < or = to 1 hr/day Bending:  < or = to 1 hr/day   Squattin hrs/day Walking:    Climbing:    Pushin hrs/day   Pullin hrs/day Other:    Reaching Above Shoulder (L):   Reaching Above Shoulder (R):       Reaching Below Shoulder (L):    Reaching Below Shoulder (R):      Not to exceed Weight Limits   Carrying(hrs):   Weight Limit(lb): < or = to 25 pounds Lifting(hrs):   Weight  Limit(lb): < or = to 25 pounds   Comments: Recommend rotating sitting, standing, and walking as needed for comfort    Repetitive Actions   Hands: i.e. Fine Manipulations from Grasping:     Feet: i.e. Operating Foot Controls:     Driving / Operate Machinery:     Health Care Provider’s Original or Electronic Signature  ALFREDO Monte  Health Care Provider’s Original or Electronic Signature    Leonardo Pappas MD         Clinic Name / Location: [address],   [address]  [place] [address] Clinic Phone Number: Dept: [phone]   Appointment Time: 8:30 Am Visit Start Time: 7:46 AM   Check-In Time:  7:41 Am Visit Discharge Time:  8:23AM   Original-Treating Physician or Chiropractor    Page 2-Insurer/TPA    Page 3-Employer    Page 4-Employee

## 2022-09-23 ENCOUNTER — OCCUPATIONAL MEDICINE (OUTPATIENT)
Dept: OCCUPATIONAL MEDICINE | Facility: CLINIC | Age: 52
End: 2022-09-23
Payer: COMMERCIAL

## 2022-09-23 DIAGNOSIS — M54.16 RADICULOPATHY OF LUMBAR REGION: ICD-10-CM

## 2022-09-23 DIAGNOSIS — S39.012D STRAIN OF LUMBAR REGION, SUBSEQUENT ENCOUNTER: ICD-10-CM

## 2022-09-23 PROCEDURE — 99213 OFFICE O/P EST LOW 20 MIN: CPT | Performed by: NURSE PRACTITIONER

## 2022-09-23 ASSESSMENT — ENCOUNTER SYMPTOMS
BACK PAIN: 1
MYALGIAS: 1
WEAKNESS: 0
TINGLING: 1
CONSTITUTIONAL NEGATIVE: 1
CARDIOVASCULAR NEGATIVE: 1
RESPIRATORY NEGATIVE: 1
SENSORY CHANGE: 1
PSYCHIATRIC NEGATIVE: 1

## 2022-09-23 NOTE — PROGRESS NOTES
Subjective:     Cuate Bravo is a 52 y.o. male who presents for Follow-Up      DOI 7/28/22: STEPHANIE: Patient states he tripped over a piece of metal at work.  He states he was seen at [facility]. Symptoms unchanged.   Pain is intense, difficulty sitting, standing, walking, and pain is constant.  He states that his leg feels numb down to his knee. He has no pertinent negatives. He is taking the muscle relaxer if needed.  He is stretching and exercising as tolerated.  Continue with light duty.   He has not been back to work since the incident. Physiatry referral is pending. Physical therapy referral placed. Plan of care discussed with patient. Patient has obtained an .     Review of Systems   Constitutional: Negative.    Respiratory: Negative.     Cardiovascular: Negative.    Musculoskeletal:  Positive for back pain and myalgias.   Skin: Negative.    Neurological:  Positive for tingling and sensory change. Negative for weakness.   Psychiatric/Behavioral: Negative.        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    Constitutional: Patient is in no acute distress. Appears well-developed and well-nourished.   Cardiovascular: Normal rate.    Pulmonary/Chest: Effort normal. No respiratory distress.   Neurological: Patient is alert and oriented to person, place, and time.   Skin: Skin is warm and dry.   Psychiatric: Normal mood and affect. Behavior is normal.     Lumbar: No gross deformity noted.  Moderate tenderness to paraspinal musculature L3-S1 and right SI joint.  Moderately  decreased flexion or rotation.  Straight leg test  positive on the right, negative on left. Cranial nerves grossly intact.  Achilles and patellar reflexes 2+ bilaterally.   Sensation intact. No gait abnormalities, slow and steady.        MRI of the lumbar 9/2/22:  Per comparison 2/24/2021:  Impression:  1.   Increase in size of left-sided disc protrusion with annular at L4-L5 resulting in mild central canal stenosis and mild bilateral neural foraminal narrowing.  2.  Mild bilateral neural foraminal narrowing L5-S1, not significantly changed.    Assessment/Plan:       1. Strain of lumbar region, subsequent encounter  - Referral to Physical Therapy    2. Radiculopathy of lumbar region  - Referral to Physical Therapy    Released to Restricted Duty FROM 9/23/2022 TO 10/13/2022  Recommend rotating sitting, standing, and walking as needed for comfort    Follow-up in 3 weeks, unless seen by physiatry  Restricted duty, per physiatry  Physiatry referral placed, transfer care  Physical therapy referral placed  MRI ordered under medical necessity only, results reviewed with patient  Recommend continue with Robaxin and naproxen as prescribed  Recommend ice/heat application, OTC topical ointments i.e. Tiger balm, Voltaren gel, or Biofreeze, and gentle range of motion stretching exercises as tolerated       Differential diagnosis, natural history, supportive care, and indications for immediate follow-up discussed.    Approximately 25 minutes was spent in preparing for visit, obtaining history, exam and evaluation, patient counseling/education and post visit documentation/orders.

## 2022-09-23 NOTE — LETTER
[address],   [address] [place] [address]  Phone:  [phone] - Fax:  [phone]   Occupational Health [facility] Progress Report and Disability Certification  Date of Service: 9/23/2022   No Show:  No  Date / Time of Next Visit: 10/13/2022 @8:00am   Claim Information   Patient Name: Cuate Bravo  Claim Number:     Employer: [employer]  Date of Injury: 7/28/2022     Insurer / TPA: Sj  ID / SSN:     Occupation:   Diagnosis: Diagnoses of Strain of lumbar region, subsequent encounter and Radiculopathy of lumbar region were pertinent to this visit.    Medical Information   Related to Industrial Injury?   Comments:Indeterminant    Subjective Complaints:  DOI 7/28/22: STEPHANIE: Patient states he tripped over a piece of metal at work.  He states he was seen at [facility]. Symptoms unchanged.   Pain is intense, difficulty sitting, standing, walking, and pain is constant.  He states that his leg feels numb down to his knee. He has no pertinent negatives. He is taking the muscle relaxer if needed.  He is stretching and exercising as tolerated.  Continue with light duty.   He has not been back to work since the incident. Physiatry referral is pending. Physical therapy referral placed. Plan of care discussed with patient. Patient has obtained an .    Objective Findings: Lumbar: No gross deformity noted.  Moderate tenderness to paraspinal musculature L3-S1 and right SI joint.  Moderately  decreased flexion or rotation.  Straight leg test  positive on the right, negative on left. Cranial nerves grossly intact.  Achilles and patellar reflexes 2+ bilaterally.   Sensation intact. No gait abnormalities, slow and steady.        MRI of the lumbar 9/2/22:  Per comparison 2/24/2021:  Impression:  1.  Increase in size of left-sided disc protrusion with annular at L4-L5 resulting in mild central canal stenosis and mild  bilateral neural foraminal narrowing.  2.  Mild bilateral neural foraminal narrowing L5-S1, not significantly changed.   Pre-Existing Condition(s):     Assessment:   Condition Same    Status: Discharged / Care Transfer  Permanent Disability:No    Plan: Medication (NOT at Work)PTTransfer Care    Diagnostics:      Comments:  Follow-up in 3 weeks, unless seen by physiatry  Restricted duty, per physiatry  Physiatry referral placed, transfer care  Physical therapy referral placed  MRI ordered under medical necessity only, results reviewed with patient  Recommend continue with Robaxin and naproxen as prescribed  Recommend ice/heat application, OTC topical ointments i.e. Tiger balm, Voltaren gel, or Biofreeze, and gentle range of motion stretching exercises as tolerated       Disability Information   Status: Released to Restricted Duty    From:  2022  Through: 10/13/2022 Restrictions are: Temporary   Physical Restrictions   Sitting:    Standing:    Stooping:    Bending:      Squatting:    Walking:    Climbing:    Pushin hrs/day   Pullin hrs/day Other:    Reaching Above Shoulder (L):   Reaching Above Shoulder (R):       Reaching Below Shoulder (L):    Reaching Below Shoulder (R):      Not to exceed Weight Limits   Carrying(hrs):   Weight Limit(lb): < or = to 25 pounds Lifting(hrs):   Weight  Limit(lb): < or = to 25 pounds   Comments: Recommend rotating sitting, standing, and walking as needed for comfort    Repetitive Actions   Hands: i.e. Fine Manipulations from Grasping:     Feet: i.e. Operating Foot Controls:     Driving / Operate Machinery:     Health Care Provider’s Original or Electronic Signature  ALFREDO Monte Health Care Provider’s Original or Electronic Signature    Leonardo Pappas MD         Clinic Name / Location: [address],   [address]  [place] [address] Clinic Phone Number: Dept: [phone]   Appointment Time: 8:45 Am Visit Start Time: 7:43 AM    Check-In Time:  7:31 Am Visit Discharge Time:  758am   Original-Treating Physician or Chiropractor    Page 2-Insurer/TPA    Page 3-Employer    Page 4-Employee

## 2023-06-21 ENCOUNTER — APPOINTMENT (OUTPATIENT)
Dept: MEDICAL GROUP | Facility: MEDICAL CENTER | Age: 53
End: 2023-06-21
Payer: COMMERCIAL

## 2023-06-28 ENCOUNTER — HOSPITAL ENCOUNTER (OUTPATIENT)
Dept: LAB | Facility: MEDICAL CENTER | Age: 53
End: 2023-06-28
Attending: STUDENT IN AN ORGANIZED HEALTH CARE EDUCATION/TRAINING PROGRAM
Payer: COMMERCIAL

## 2023-06-28 ENCOUNTER — OFFICE VISIT (OUTPATIENT)
Dept: MEDICAL GROUP | Facility: MEDICAL CENTER | Age: 53
End: 2023-06-28
Payer: COMMERCIAL

## 2023-06-28 VITALS
WEIGHT: 208 LBS | TEMPERATURE: 98.2 F | SYSTOLIC BLOOD PRESSURE: 180 MMHG | RESPIRATION RATE: 16 BRPM | DIASTOLIC BLOOD PRESSURE: 80 MMHG | OXYGEN SATURATION: 99 % | HEART RATE: 82 BPM | BODY MASS INDEX: 29.12 KG/M2 | HEIGHT: 71 IN

## 2023-06-28 DIAGNOSIS — I10 HYPERTENSION, UNSPECIFIED TYPE: ICD-10-CM

## 2023-06-28 DIAGNOSIS — Z12.5 PROSTATE CANCER SCREENING: ICD-10-CM

## 2023-06-28 DIAGNOSIS — K21.9 GASTROESOPHAGEAL REFLUX DISEASE WITHOUT ESOPHAGITIS: ICD-10-CM

## 2023-06-28 DIAGNOSIS — Z72.0 TOBACCO ABUSE: ICD-10-CM

## 2023-06-28 LAB
ALBUMIN SERPL BCP-MCNC: 4.3 G/DL (ref 3.2–4.9)
ALBUMIN/GLOB SERPL: 1.3 G/DL
ALP SERPL-CCNC: 65 U/L (ref 30–99)
ALT SERPL-CCNC: 21 U/L (ref 2–50)
ANION GAP SERPL CALC-SCNC: 11 MMOL/L (ref 7–16)
AST SERPL-CCNC: 25 U/L (ref 12–45)
BILIRUB SERPL-MCNC: 0.5 MG/DL (ref 0.1–1.5)
BUN SERPL-MCNC: 8 MG/DL (ref 8–22)
CALCIUM ALBUM COR SERPL-MCNC: 9.2 MG/DL (ref 8.5–10.5)
CALCIUM SERPL-MCNC: 9.4 MG/DL (ref 8.5–10.5)
CHLORIDE SERPL-SCNC: 105 MMOL/L (ref 96–112)
CHOLEST SERPL-MCNC: 142 MG/DL (ref 100–199)
CO2 SERPL-SCNC: 25 MMOL/L (ref 20–33)
CREAT SERPL-MCNC: 0.84 MG/DL (ref 0.5–1.4)
EST. AVERAGE GLUCOSE BLD GHB EST-MCNC: 108 MG/DL
GFR SERPLBLD CREATININE-BSD FMLA CKD-EPI: 105 ML/MIN/1.73 M 2
GLOBULIN SER CALC-MCNC: 3.4 G/DL (ref 1.9–3.5)
GLUCOSE SERPL-MCNC: 92 MG/DL (ref 65–99)
HBA1C MFR BLD: 5.4 % (ref 4–5.6)
HDLC SERPL-MCNC: 59 MG/DL
LDLC SERPL CALC-MCNC: 73 MG/DL
POTASSIUM SERPL-SCNC: 4 MMOL/L (ref 3.6–5.5)
PROT SERPL-MCNC: 7.7 G/DL (ref 6–8.2)
PSA SERPL-MCNC: 0.75 NG/ML (ref 0–4)
SODIUM SERPL-SCNC: 141 MMOL/L (ref 135–145)
TRIGL SERPL-MCNC: 52 MG/DL (ref 0–149)
TSH SERPL DL<=0.005 MIU/L-ACNC: 0.6 UIU/ML (ref 0.38–5.33)

## 2023-06-28 PROCEDURE — 36415 COLL VENOUS BLD VENIPUNCTURE: CPT

## 2023-06-28 PROCEDURE — 3077F SYST BP >= 140 MM HG: CPT | Performed by: STUDENT IN AN ORGANIZED HEALTH CARE EDUCATION/TRAINING PROGRAM

## 2023-06-28 PROCEDURE — 84443 ASSAY THYROID STIM HORMONE: CPT

## 2023-06-28 PROCEDURE — 84153 ASSAY OF PSA TOTAL: CPT

## 2023-06-28 PROCEDURE — 99204 OFFICE O/P NEW MOD 45 MIN: CPT | Performed by: STUDENT IN AN ORGANIZED HEALTH CARE EDUCATION/TRAINING PROGRAM

## 2023-06-28 PROCEDURE — 83036 HEMOGLOBIN GLYCOSYLATED A1C: CPT

## 2023-06-28 PROCEDURE — 3079F DIAST BP 80-89 MM HG: CPT | Performed by: STUDENT IN AN ORGANIZED HEALTH CARE EDUCATION/TRAINING PROGRAM

## 2023-06-28 PROCEDURE — 80053 COMPREHEN METABOLIC PANEL: CPT

## 2023-06-28 PROCEDURE — 80061 LIPID PANEL: CPT

## 2023-06-28 RX ORDER — AMLODIPINE BESYLATE 5 MG/1
5 TABLET ORAL DAILY
Qty: 90 TABLET | Refills: 3 | Status: SHIPPED | OUTPATIENT
Start: 2023-06-28 | End: 2023-07-31

## 2023-06-28 RX ORDER — OMEPRAZOLE 40 MG/1
40 CAPSULE, DELAYED RELEASE ORAL DAILY
Qty: 90 CAPSULE | Refills: 3 | Status: SHIPPED | OUTPATIENT
Start: 2023-06-28 | End: 2023-06-30

## 2023-06-28 ASSESSMENT — PATIENT HEALTH QUESTIONNAIRE - PHQ9: CLINICAL INTERPRETATION OF PHQ2 SCORE: 0

## 2023-06-28 NOTE — LETTER
July 3, 2023         Cuate Bravo  [address]  [facility] [address]        Dear Mack:      Below are the results from your recent visit:    Resulted Orders   PROSTATE SPECIFIC AG SCREENING   Result Value Ref Range    Prostatic Specific Antigen Tot 0.75 0.00 - 4.00 ng/mL      Comment:      Performed using Roche nilesh e immunoassay analyzer. tPSA values determined  on patient samples by different testing procedures cannot be directly  compared with one another and could be the cause of erroneous medical  interpretations. If there is a change in the tPSA assay procedure used while  monitoring therapy, then new baselines may need to be established when  comparing previous results.      Narrative    Request patient fasting?->Yes  Fasting Instructions:->Fast 8-10 hours, OK to drink water as  needed during fast, take medications per your provider's  instruction.   TSH WITH REFLEX TO FT4   Result Value Ref Range    TSH 0.600 0.380 - 5.330 uIU/mL      Comment:      The 2011 American Thyroid Association (JACKSON) guidelines  recommended that the interpretation of thyroid function in  pregnancy be based on trimester specific reference ranges.    1st Trimester  0.100-2.500 mIU/L  2nd Trimester  0.200-3.000 mIU/L  3rd Trimester  0.300-3.500 mIU/L    These established reference ranges have not been validated  at [facility].      Narrative    Request patient fasting?->Yes  Fasting Instructions:->Fast 8-10 hours, OK to drink water as  needed during fast, take medications per your provider's  instruction.   HEMOGLOBIN A1C   Result Value Ref Range    Glycohemoglobin 5.4 4.0 - 5.6 %      Comment:      Increased risk for diabetes:  5.7 -6.4%  Diabetes:  >6.4%  Glycemic control for adults with diabetes:  <7.0%    The above interpretations are per ADA guidelines.  Diagnosis  of diabetes mellitus on the basis of elevated Hemoglobin A1c  should be confirmed by repeating the Hb A1c test.      Est Avg Glucose 108  mg/dL      Comment:      The eAG calculation is based on the A1c-Derived Daily Glucose  (ADAG) study.  See the ADA's website for additional information.      Narrative    Request patient fasting?->Yes  Fasting Instructions:->Fast 8-10 hours, OK to drink water as  needed during fast, take medications per your provider's  instruction.   Lipid Profile   Result Value Ref Range    Cholesterol,Tot 142 100 - 199 mg/dL    Triglycerides 52 0 - 149 mg/dL    HDL 59 >=40 mg/dL    LDL 73 <100 mg/dL    Narrative    Request patient fasting?->Yes  Fasting Instructions:->Fast 8-10 hours, OK to drink water as  needed during fast, take medications per your provider's  instruction.   Comp Metabolic Panel   Result Value Ref Range    Sodium 141 135 - 145 mmol/L    Potassium 4.0 3.6 - 5.5 mmol/L    Chloride 105 96 - 112 mmol/L    Co2 25 20 - 33 mmol/L    Anion Gap 11.0 7.0 - 16.0    Glucose 92 65 - 99 mg/dL    Bun 8 8 - 22 mg/dL    Creatinine 0.84 0.50 - 1.40 mg/dL    Calcium 9.4 8.5 - 10.5 mg/dL    AST(SGOT) 25 12 - 45 U/L    ALT(SGPT) 21 2 - 50 U/L    Alkaline Phosphatase 65 30 - 99 U/L    Total Bilirubin 0.5 0.1 - 1.5 mg/dL    Albumin 4.3 3.2 - 4.9 g/dL    Total Protein 7.7 6.0 - 8.2 g/dL    Globulin 3.4 1.9 - 3.5 g/dL    A-G Ratio 1.3 g/dL    Narrative    Request patient fasting?->Yes  Fasting Instructions:->Fast 8-10 hours, OK to drink water as  needed during fast, take medications per your provider's  instruction.   CORRECTED CALCIUM   Result Value Ref Range    Correct Calcium 9.2 8.5 - 10.5 mg/dL    Narrative    Request patient fasting?->Yes  Fasting Instructions:->Fast 8-10 hours, OK to drink water as  needed during fast, take medications per your provider's  instruction.   ESTIMATED GFR   Result Value Ref Range    GFR (CKD-EPI) 105 >60 mL/min/1.73 m 2      Comment:      Estimated Glomerular Filtration Rate is calculated using  race neutral CKD-EPI 2021 equation per NKF-ASN recommendations.      Narrative    Request patient  fasting?->Yes  Fasting Instructions:->Fast 8-10 hours, OK to drink water as  needed during fast, take medications per your provider's  instruction.     The test results show that everything is all within normal limits .    If you have any questions or concerns, please don't hesitate to call.        Sincerely,  Alexa Cotto P.A.-C.   Electronically Signed

## 2023-06-28 NOTE — PROGRESS NOTES
Subjective:     Chief Complaint   Patient presents with    Establish Care         HPI:   Cuate presents today to establish care.  No previous PCP.    Hypertension  Patient presents today for hypertension.  Patient's blood pressure 180/80.  Patient with previous diagnosis of elevated blood pressure but has not been on medication for this.  Patient concerned that his blood pressure has been this high for a while.  Patient does note headaches, fatigue, vision changes.    Tobacco use  Patient with history of tobacco abuse.  Chronic tobacco use, notes that he smokes approximately .5 to 1 pack a day for the last 30 years.    Chronic low back pain  Patient notes that he hurt his back at work.  Patient continues to work with [employer] for this injury.  Patient doing physical therapy and getting injections in his back with [facility] [place].  Is not currently taking any oral medications for back pain.      Colonoscopy-patient with a history of colonoscopy completed 2 years ago.  Patient notes that it was normal.      Acid reflux   Patient presents today for concern about heartburn.  Patient notes pain in his epigastric region that gets worse when he eats spicy foods or red sauces.  Patient notes that he drinks a lot of coffee daily along with spicy foods.  Patient with previous prescription for omeprazole but states he recently ran out.  Patient notes that pain became extremely severe in his chest, severe pain was relieved by Pepcid.    Patient notes that his exercise is limited due to back injury and chronic back pain.  Patient notes a poor diet.        ROS:  Gen: no fevers/chills, no changes in weight  Eyes: no changes in vision  ENT: no sore throat, no hearing loss, no bloody nose  Pulm: no sob, no cough  CV: no chest pain, no palpitations  GI: no nausea/vomiting, no diarrhea      Objective:     Exam:  BP (!) 180/80 (BP Location: Left arm, Patient Position: Sitting, BP Cuff Size: Adult)   Pulse 82   Temp 36.8 °C (98.2  "°F) (Temporal)   Resp 16   Ht 1.803 m (5' 11\")   Wt 94.3 kg (208 lb)   SpO2 99%   BMI 29.01 kg/m²  Body mass index is 29.01 kg/m².    Gen: Alert and oriented, No apparent distress.  Neck: Neck is supple without lymphadenopathy.  Lungs: Normal effort, CTA bilaterally, no wheezes, rhonchi, or rales  CV: Regular rate and rhythm. No murmurs, rubs, or gallops.  Ext: No clubbing, cyanosis, edema.      Assessment & Plan:     53 y.o. male with the following -     1. Hypertension, unspecified type  Chronic, uncontrolled.  Patient with uncontrolled blood pressure 180/80.  Discussed signs and symptoms that warrant emergent evaluation in the ED.  We will start patient on amlodipine 5 mg and follow-up in 2 weeks to further evaluate.  - amLODIPine (NORVASC) 5 MG Tab; Take 1 Tablet by mouth every day.  Dispense: 90 Tablet; Refill: 3  - Comp Metabolic Panel; Future  - Lipid Profile; Future  - HEMOGLOBIN A1C; Future  - TSH WITH REFLEX TO FT4; Future    2. Prostate cancer screening  - PROSTATE SPECIFIC AG SCREENING; Future    3. Gastroesophageal reflux disease without esophagitis  Chronic, stable.  Patient notes that when he was incarcerated he was positive for H. pylori.  Due to worsening acid reflux, patient concerned that H. pylori has returned.  Patient taking omeprazole 40 mg but continues to have back for acid reflux.  Discussed food and eating strategies to reduce acid reflux.  - H.PYLORI STOOL ANTIGEN; Future  - omeprazole (PRILOSEC) 40 MG delayed-release capsule; Take 1 Capsule by mouth every day.  Dispense: 90 Capsule; Refill: 3    4. Tobacco abuse  Patient not interested in quitting at this time.  Discussed with patient portance of abstaining or decreasing tobacco use.    No follow-ups on file.    Please note that this dictation was created using voice recognition software. I have made every reasonable attempt to correct obvious errors, but I expect that there are errors of grammar and possibly content that I did not " discover before finalizing the note.

## 2023-07-03 ENCOUNTER — TELEPHONE (OUTPATIENT)
Dept: MEDICAL GROUP | Facility: MEDICAL CENTER | Age: 53
End: 2023-07-03
Payer: COMMERCIAL

## 2023-07-30 ENCOUNTER — HOSPITAL ENCOUNTER (OUTPATIENT)
Facility: MEDICAL CENTER | Age: 53
End: 2023-07-30
Attending: STUDENT IN AN ORGANIZED HEALTH CARE EDUCATION/TRAINING PROGRAM
Payer: COMMERCIAL

## 2023-07-30 PROCEDURE — 87338 HPYLORI STOOL AG IA: CPT

## 2023-07-30 NOTE — LETTER
"August 1, 2023         Cuate Bravo  [address]  [place] [address]        Dear Cuate:      Below are the results from your recent visit:    Resulted Orders   H.PYLORI STOOL ANTIGEN   Result Value Ref Range    H Pylori Ag Stool E Not Detected Not Detected    Narrative    Patient height (in inches)->1.803 m (5' 11\")  Patient weight (in lbs)->208     The test results show that your H. pylori test was negative.  Referral has been placed to GI.  Strongly recommend that you continue to work on dietary changes for acid reflux.     If you have any questions or concerns, please don't hesitate to call.        Sincerely,    Alexa Cotto P.A.-C.   Electronically Signed  "

## 2023-07-31 ENCOUNTER — OFFICE VISIT (OUTPATIENT)
Dept: MEDICAL GROUP | Facility: MEDICAL CENTER | Age: 53
End: 2023-07-31
Payer: COMMERCIAL

## 2023-07-31 VITALS
HEART RATE: 77 BPM | SYSTOLIC BLOOD PRESSURE: 144 MMHG | RESPIRATION RATE: 18 BRPM | BODY MASS INDEX: 30.1 KG/M2 | TEMPERATURE: 98 F | WEIGHT: 215 LBS | DIASTOLIC BLOOD PRESSURE: 90 MMHG | OXYGEN SATURATION: 98 % | HEIGHT: 71 IN

## 2023-07-31 DIAGNOSIS — K21.9 GASTROESOPHAGEAL REFLUX DISEASE WITHOUT ESOPHAGITIS: ICD-10-CM

## 2023-07-31 DIAGNOSIS — I10 HYPERTENSION, UNSPECIFIED TYPE: ICD-10-CM

## 2023-07-31 LAB — H PYLORI AG STL QL IA: NOT DETECTED

## 2023-07-31 PROCEDURE — 99214 OFFICE O/P EST MOD 30 MIN: CPT | Performed by: STUDENT IN AN ORGANIZED HEALTH CARE EDUCATION/TRAINING PROGRAM

## 2023-07-31 PROCEDURE — 3077F SYST BP >= 140 MM HG: CPT | Performed by: STUDENT IN AN ORGANIZED HEALTH CARE EDUCATION/TRAINING PROGRAM

## 2023-07-31 PROCEDURE — 3080F DIAST BP >= 90 MM HG: CPT | Performed by: STUDENT IN AN ORGANIZED HEALTH CARE EDUCATION/TRAINING PROGRAM

## 2023-07-31 RX ORDER — ESOMEPRAZOLE MAGNESIUM 40 MG/1
40 GRANULE, DELAYED RELEASE ORAL
Qty: 90 EACH | Refills: 3 | Status: SHIPPED | OUTPATIENT
Start: 2023-07-31 | End: 2023-09-05

## 2023-07-31 RX ORDER — AMLODIPINE BESYLATE AND BENAZEPRIL HYDROCHLORIDE 5; 20 MG/1; MG/1
1 CAPSULE ORAL DAILY
Qty: 30 CAPSULE | Refills: 3 | Status: SHIPPED | OUTPATIENT
Start: 2023-07-31 | End: 2023-08-24

## 2023-07-31 NOTE — PROGRESS NOTES
"Subjective:     Chief Complaint   Patient presents with    Hypertension Follow-up    Lab Results         HPI:   Cuate presents today with     Hypertension  Patient presents today for follow-up on hypertension.  Patient's blood pressure elevated at last visit 180/80.  Patient started on amlodipine 5 mg at last visit.  Patient's blood pressure improved today 144/90 but not controlled.    Tobacco use  Patient with history of tobacco abuse.  Chronic tobacco use, notes that he smokes approximately .5 to 1 pack a day for the last 30 years.    GERD  Patient noted uncontrolled acid reflux at last visit.  Patient has been taking omeprazole 40 mg daily with minimal relief.  Discussed retesting for H. pylori.           ROS:  Gen: no fevers/chills, no changes in weight  Eyes: no changes in vision  ENT: no sore throat, no hearing loss, no bloody nose  Pulm: no sob, no cough  CV: no chest pain, no palpitations  GI: no nausea/vomiting, no diarrhea      Objective:     Exam:  BP (!) 144/90 (BP Location: Right arm, Patient Position: Sitting, BP Cuff Size: Adult)   Pulse 77   Temp 36.7 °C (98 °F)   Resp 18   Ht 1.803 m (5' 11\")   Wt 97.5 kg (215 lb)   SpO2 98%   BMI 29.99 kg/m²  Body mass index is 29.99 kg/m².    Gen: Alert and oriented, No apparent distress.  Neck: Neck is supple without lymphadenopathy.  Lungs: Normal effort, CTA bilaterally, no wheezes, rhonchi, or rales  CV: Regular rate and rhythm. No murmurs, rubs, or gallops.  Ext: No clubbing, cyanosis, edema.      Assessment & Plan:     53 y.o. male with the following -     1. Hypertension, unspecified type  Chronic, uncontrolled.  Blood pressure improved on amlodipine 5 mg.  Discussed patient diet and exercise to help control blood pressure.  Patient will continue to work on diet and exercise.  We will add benazepril 20 mg.  Discussed with patient that it is inhibiting may be less effective due to -American.  We will continue to monitor.  - amlodipine-benazepril " (LOTREL) 5-20 MG per capsule; Take 1 Capsule by mouth every day.  Dispense: 30 Capsule; Refill: 3    2. Gastroesophageal reflux disease without esophagitis  Chronic, stable.  Patient notes that he completed H. pylori testing today.  Will await results.  If H. pylori positive, will treat.  If H. pylori negative.  Will refer to GI.  - esomeprazole magnesium (NEXIUM) 40 MG Pack; Take 40 mg by mouth every morning before breakfast.  Dispense: 90 Each; Refill: 3       No follow-ups on file.    Please note that this dictation was created using voice recognition software. I have made every reasonable attempt to correct obvious errors, but I expect that there are errors of grammar and possibly content that I did not discover before finalizing the note.

## 2023-08-01 DIAGNOSIS — K21.9 GASTROESOPHAGEAL REFLUX DISEASE WITHOUT ESOPHAGITIS: ICD-10-CM

## 2023-08-01 DIAGNOSIS — Z12.5 PROSTATE CANCER SCREENING: ICD-10-CM

## 2023-08-01 NOTE — RESULT ENCOUNTER NOTE
Can you call patient and let him know the H. pylori test was negative.  Referral has been placed to GI.  Strongly recommend patient continue to work on dietary changes for acid reflux.

## 2023-08-29 ENCOUNTER — APPOINTMENT (OUTPATIENT)
Dept: MEDICAL GROUP | Facility: MEDICAL CENTER | Age: 53
End: 2023-08-29
Payer: COMMERCIAL

## 2023-09-05 ENCOUNTER — OFFICE VISIT (OUTPATIENT)
Dept: MEDICAL GROUP | Facility: MEDICAL CENTER | Age: 53
End: 2023-09-05
Payer: COMMERCIAL

## 2023-09-05 VITALS
OXYGEN SATURATION: 97 % | SYSTOLIC BLOOD PRESSURE: 148 MMHG | HEIGHT: 71 IN | RESPIRATION RATE: 16 BRPM | BODY MASS INDEX: 30.34 KG/M2 | TEMPERATURE: 98.6 F | DIASTOLIC BLOOD PRESSURE: 90 MMHG | WEIGHT: 216.71 LBS | HEART RATE: 77 BPM

## 2023-09-05 DIAGNOSIS — K21.9 GASTROESOPHAGEAL REFLUX DISEASE WITHOUT ESOPHAGITIS: ICD-10-CM

## 2023-09-05 DIAGNOSIS — I10 HYPERTENSION, UNSPECIFIED TYPE: ICD-10-CM

## 2023-09-05 PROCEDURE — 3080F DIAST BP >= 90 MM HG: CPT | Performed by: STUDENT IN AN ORGANIZED HEALTH CARE EDUCATION/TRAINING PROGRAM

## 2023-09-05 PROCEDURE — 3077F SYST BP >= 140 MM HG: CPT | Performed by: STUDENT IN AN ORGANIZED HEALTH CARE EDUCATION/TRAINING PROGRAM

## 2023-09-05 PROCEDURE — 99214 OFFICE O/P EST MOD 30 MIN: CPT | Performed by: STUDENT IN AN ORGANIZED HEALTH CARE EDUCATION/TRAINING PROGRAM

## 2023-09-05 RX ORDER — AMLODIPINE BESYLATE 10 MG/1
10 TABLET ORAL DAILY
Qty: 90 TABLET | Refills: 3 | Status: SHIPPED | OUTPATIENT
Start: 2023-09-05

## 2023-09-05 RX ORDER — OMEPRAZOLE 40 MG/1
40 CAPSULE, DELAYED RELEASE ORAL DAILY
Qty: 90 CAPSULE | Refills: 3 | Status: SHIPPED | OUTPATIENT
Start: 2023-09-05

## 2023-09-05 NOTE — LETTER
[employer] [facility]  Alexa Cotto P.A.-C.  [address]  [facility] [address]  Fax: [phone]   Authorization for Release/Disclosure of   Protected Health Information   Name: OSMAR CERDA : 1970 SSN: [id number]   Address: [address] [place] [address] Phone:    [phone] (home)    I authorize the entity listed below to release/disclose the PHI below to:   [facility]/Alexa Cotto P.A.-C. and Alexa Cotto P.A.-C.   Provider or Entity Name:  [facility]   Address   City, [facility], Zip:               [address] [place] [address], [place] [address]   Phone:  [phone]      Fax:      [phone]        Reason for request: continuity of care   Information to be released:    [ X ] LAST COLONOSCOPY,  including any PATH REPORT and follow-up  [ X ] LAST FIT/COLOGUARD RESULT [  ] LAST DEXA  [  ] LAST MAMMOGRAM  [  ] LAST PAP  [  ] LAST LABS [  ] RETINA EXAM REPORT  [  ] IMMUNIZATION RECORDS  [  ] Release all info      [  ] Check here and initial the line next to each item to release ALL health information INCLUDING  _____ Care and treatment for drug and / or alcohol abuse  _____ HIV testing, infection status, or AIDS  _____ Genetic Testing  DATES OF SERVICE OR TIME PERIOD TO BE DISCLOSED: _____________  I understand and acknowledge that:  * This Authorization may be revoked at any time by you in writing, except if your health information has already been used or disclosed.  * Your health information that will be used or disclosed as a result of you signing this authorization could be re-disclosed by the recipient. If this occurs, your re-disclosed health information may no longer be protected by State or Federal laws.  * You may refuse to sign this Authorization. Your refusal will not affect your ability to obtain treatment.  * This Authorization becomes effective upon signing and will  on (date) __________.      If no date is indicated, this Authorization will  one  (1) year from the signature date.    Name: Cuate Bravo    Signature:   Date:     9/5/2023       PLEASE FAX REQUESTED RECORDS BACK TO: [phone]

## 2023-09-05 NOTE — PROGRESS NOTES
"Subjective:     Chief Complaint   Patient presents with    Follow-Up     Meds         HPI:   Cuate presents today with     Hypertension  Patient presents today for follow-up on hypertension.  Patient's blood pressure initially elevated at 180/80, started on amlodipine 5 mg but continued to be elevated.  Patient changed to amlodipine-benazepril 5/20.  Patient is no longer taking his blood pressure medication.  Patient's blood pressure is elevated today 148/90.  Patient notes that the medication made him cough more often, lots of phlegm, pressure behind eyeballs and blurry vision.  Patient notes that within 24 hours of stopping the medication the symptoms improved.  Possible reaction to ACE inhibitor.  We will hold restart patient on amlodipine and increased dose to 10 mg.    GERD  Patient continues on omeprazole 40 mg daily.  Patient was switched to esomeprazole due to request at last visit, prefers omeprazole 40 mg.  Patient continues to work on dietary changes.      ROS:  Gen: no fevers/chills  Pulm: no sob, no cough  CV: no chest pain, no palpitations  GI: no nausea/vomiting, no diarrhea      Objective:     Exam:  BP (!) 148/90   Pulse 77   Temp 37 °C (98.6 °F) (Temporal)   Resp 16   Ht 1.803 m (5' 11\")   Wt 98.3 kg (216 lb 11.4 oz)   SpO2 97%   BMI 30.23 kg/m²  Body mass index is 30.23 kg/m².    Gen: Alert and oriented, No apparent distress.  Neck: Neck is supple without lymphadenopathy.  Lungs: Normal effort, CTA bilaterally, no wheezes, rhonchi, or rales  CV: Regular rate and rhythm. No murmurs, rubs, or gallops.  Ext: No clubbing, cyanosis, edema.      Assessment & Plan:     53 y.o. male with the following -     1. Hypertension, unspecified type  Chronic, stable.  Blood pressure elevated today.  Patient no longer on medication.  Patient had reaction to ACE inhibitor.  We will change medication back to amlodipine and increased dose from 5 mg to 10 mg.  Patient will follow-up if no improvement.  - " amLODIPine (NORVASC) 10 MG Tab; Take 1 Tablet by mouth every day.  Dispense: 90 Tablet; Refill: 3    2. Gastroesophageal reflux disease without esophagitis  - omeprazole (PRILOSEC) 40 MG delayed-release capsule; Take 1 Capsule by mouth every day.  Dispense: 90 Capsule; Refill: 3       No follow-ups on file.    Please note that this dictation was created using voice recognition software. I have made every reasonable attempt to correct obvious errors, but I expect that there are errors of grammar and possibly content that I did not discover before finalizing the note.

## 2023-12-29 ENCOUNTER — DOCUMENTATION (OUTPATIENT)
Dept: HEALTH INFORMATION MANAGEMENT | Facility: OTHER | Age: 53
End: 2023-12-29
Payer: COMMERCIAL

## 2024-02-02 ENCOUNTER — TELEPHONE (OUTPATIENT)
Dept: HEALTH INFORMATION MANAGEMENT | Facility: OTHER | Age: 54
End: 2024-02-02
Payer: COMMERCIAL

## 2025-03-03 DIAGNOSIS — I10 HYPERTENSION, UNSPECIFIED TYPE: ICD-10-CM

## 2025-03-03 DIAGNOSIS — K21.9 GASTROESOPHAGEAL REFLUX DISEASE WITHOUT ESOPHAGITIS: ICD-10-CM

## 2025-03-03 NOTE — TELEPHONE ENCOUNTER
Received request via: Pharmacy    Was the patient seen in the last year in this department? No    Does the patient have an active prescription (recently filled or refills available) for medication(s) requested? No    Pharmacy Name: [facility]/pharmacy #[id number] - [place], [place] - [address]    Does the patient have skilled nursing Plus and need 100-day supply? (This applies to ALL medications) Patient does not have SCP      Alessia Cavada  [address]  Pt needs a refill of Amlodipine and Omeprazole [facility] in [place]. He is aware he needs to reestablish but needs a refill until he can. Please call and let patient know if he can get a refill.

## 2025-03-05 ENCOUNTER — OFFICE VISIT (OUTPATIENT)
Dept: URGENT CARE | Facility: PHYSICIAN GROUP | Age: 55
End: 2025-03-05
Payer: COMMERCIAL

## 2025-03-05 VITALS
HEART RATE: 80 BPM | WEIGHT: 200 LBS | BODY MASS INDEX: 28 KG/M2 | HEIGHT: 71 IN | DIASTOLIC BLOOD PRESSURE: 80 MMHG | SYSTOLIC BLOOD PRESSURE: 126 MMHG | TEMPERATURE: 98.6 F | OXYGEN SATURATION: 98 % | RESPIRATION RATE: 18 BRPM

## 2025-03-05 DIAGNOSIS — I10 HYPERTENSION, UNSPECIFIED TYPE: ICD-10-CM

## 2025-03-05 DIAGNOSIS — K21.9 GASTROESOPHAGEAL REFLUX DISEASE, UNSPECIFIED WHETHER ESOPHAGITIS PRESENT: ICD-10-CM

## 2025-03-05 PROCEDURE — 3074F SYST BP LT 130 MM HG: CPT | Performed by: PHYSICIAN ASSISTANT

## 2025-03-05 PROCEDURE — 3079F DIAST BP 80-89 MM HG: CPT | Performed by: PHYSICIAN ASSISTANT

## 2025-03-05 PROCEDURE — 99213 OFFICE O/P EST LOW 20 MIN: CPT | Performed by: PHYSICIAN ASSISTANT

## 2025-03-05 RX ORDER — OMEPRAZOLE 20 MG/1
20 CAPSULE, DELAYED RELEASE ORAL DAILY
Qty: 30 CAPSULE | Refills: 0 | Status: SHIPPED | OUTPATIENT
Start: 2025-03-05 | End: 2025-03-09

## 2025-03-05 RX ORDER — AMLODIPINE BESYLATE 10 MG/1
10 TABLET ORAL DAILY
Qty: 30 TABLET | Refills: 0 | Status: SHIPPED | OUTPATIENT
Start: 2025-03-05 | End: 2025-03-09

## 2025-03-05 ASSESSMENT — ENCOUNTER SYMPTOMS
ABDOMINAL PAIN: 0
SHORTNESS OF BREATH: 0
PALPITATIONS: 0
NAUSEA: 0
VOMITING: 0
HEADACHES: 0
HEARTBURN: 0

## 2025-03-05 NOTE — PROGRESS NOTES
Subjective:     CHIEF COMPLAINT  Chief Complaint   Patient presents with    Medication Refill     Amlodipine and Omeprazole        HPI  Cuate Bravo is a very pleasant 55 y.o. male who presents to the clinic requesting a medication refill.  Patient is currently in between providers and set to establish with his new PCP at the end of this month.  Has been taking amlodipine for multiple years for hypertension.  Blood pressure has been well-controlled without any side effects.  Also taking omeprazole for GERD which has been well-controlled.  Currently feels well.    REVIEW OF SYSTEMS  Review of Systems   Respiratory:  Negative for shortness of breath.    Cardiovascular:  Negative for chest pain and palpitations.   Gastrointestinal:  Negative for abdominal pain, heartburn, nausea and vomiting.   Neurological:  Negative for headaches.       PAST MEDICAL HISTORY  Patient Active Problem List    Diagnosis Date Noted    Tobacco abuse 08/30/2016    Elevated blood pressure 08/30/2016       SURGICAL HISTORY  patient denies any surgical history    ALLERGIES  No Known Allergies    CURRENT MEDICATIONS  Home Medications       Reviewed by Anibal De Paz P.A.-C. (Physician Assistant) on 03/05/25 at 1005  Med List Status: <None>     Medication Last Dose Status   amLODIPine (NORVASC) 10 MG Tab Taking Active   omeprazole (PRILOSEC) 40 MG delayed-release capsule Taking Active                    SOCIAL HISTORY  Social History     Tobacco Use    Smoking status: Some Days     Current packs/day: 0.25     Types: Cigarettes    Smokeless tobacco: Never   Vaping Use    Vaping status: Never Used   Substance and Sexual Activity    Alcohol use: Not Currently    Drug use: No    Sexual activity: Not on file       FAMILY HISTORY  Family History   Problem Relation Age of Onset    Other Mother         unknown    Seizures Father           Objective:     VITAL SIGNS: /80 (BP Location: Right arm, Patient Position: Sitting, BP Cuff Size: Adult)  "  Pulse 80   Temp 37 °C (98.6 °F) (Temporal)   Resp 18   Ht 1.803 m (5' 11\")   Wt 90.7 kg (200 lb)   SpO2 98%   BMI 27.89 kg/m²     PHYSICAL EXAM  Physical Exam  Constitutional:       General: He is not in acute distress.     Appearance: Normal appearance. He is not ill-appearing, toxic-appearing or diaphoretic.   HENT:      Head: Normocephalic and atraumatic.   Eyes:      Conjunctiva/sclera: Conjunctivae normal.   Cardiovascular:      Rate and Rhythm: Normal rate and regular rhythm.      Pulses: Normal pulses.      Heart sounds: Normal heart sounds.   Pulmonary:      Effort: Pulmonary effort is normal.   Musculoskeletal:      Cervical back: Normal range of motion.   Neurological:      General: No focal deficit present.      Mental Status: He is alert and oriented to person, place, and time. Mental status is at baseline.         Assessment/Plan:     1. Hypertension, unspecified type  - amLODIPine (NORVASC) 10 MG Tab; Take 1 Tablet by mouth every day for 30 days.  Dispense: 30 Tablet; Refill: 0    2. Gastroesophageal reflux disease, unspecified whether esophagitis present  - omeprazole (PRILOSEC) 20 MG delayed-release capsule; Take 1 Capsule by mouth every day.  Dispense: 30 Capsule; Refill: 0      MDM/Comments:    Patient has been stable on his medications for multiple years.  Currently pending establishing with a new PCP at the end of the month.  Refills provided.    Differential diagnosis, natural history, supportive care, and indications for immediate follow-up discussed. All questions answered. Patient agrees with the plan of care.    Follow-up as needed if symptoms worsen or fail to improve to PCP, Urgent care or Emergency Room.    I have personally reviewed prior external notes and test results pertinent to today's visit.  I have independently reviewed and interpreted all diagnostics ordered during this urgent care acute visit.   Discussed management options (risks,benefits, and alternatives to " treatment). Pt expresses understanding and the treatment plan was agreed upon. Questions were encouraged and answered to pt's satisfaction.    Please note that this dictation was created using voice recognition software. I have made a reasonable attempt to correct obvious errors, but I expect that there are errors of grammar and possibly content that I did not discover before finalizing the note.

## 2025-03-09 RX ORDER — OMEPRAZOLE 40 MG/1
40 CAPSULE, DELAYED RELEASE ORAL DAILY
Qty: 90 CAPSULE | Refills: 0 | Status: SHIPPED | OUTPATIENT
Start: 2025-03-09 | End: 2025-03-31 | Stop reason: SDUPTHER

## 2025-03-09 RX ORDER — AMLODIPINE BESYLATE 10 MG/1
10 TABLET ORAL DAILY
Qty: 90 TABLET | Refills: 0 | Status: SHIPPED | OUTPATIENT
Start: 2025-03-09 | End: 2025-03-31

## 2025-03-31 ENCOUNTER — TELEPHONE (OUTPATIENT)
Dept: MEDICAL GROUP | Facility: MEDICAL CENTER | Age: 55
End: 2025-03-31

## 2025-03-31 ENCOUNTER — OFFICE VISIT (OUTPATIENT)
Dept: MEDICAL GROUP | Facility: MEDICAL CENTER | Age: 55
End: 2025-03-31
Payer: COMMERCIAL

## 2025-03-31 VITALS
BODY MASS INDEX: 29.9 KG/M2 | TEMPERATURE: 97.6 F | SYSTOLIC BLOOD PRESSURE: 152 MMHG | DIASTOLIC BLOOD PRESSURE: 86 MMHG | WEIGHT: 213.6 LBS | RESPIRATION RATE: 16 BRPM | HEART RATE: 80 BPM | HEIGHT: 71 IN | OXYGEN SATURATION: 99 %

## 2025-03-31 DIAGNOSIS — F41.8 SITUATIONAL ANXIETY: ICD-10-CM

## 2025-03-31 DIAGNOSIS — K21.9 GASTROESOPHAGEAL REFLUX DISEASE WITHOUT ESOPHAGITIS: ICD-10-CM

## 2025-03-31 DIAGNOSIS — I10 PRIMARY HYPERTENSION: Primary | ICD-10-CM

## 2025-03-31 DIAGNOSIS — Z12.5 PROSTATE CANCER SCREENING: ICD-10-CM

## 2025-03-31 DIAGNOSIS — H92.11 EAR DISCHARGE OF RIGHT EAR: ICD-10-CM

## 2025-03-31 DIAGNOSIS — Z00.00 HEALTH CARE MAINTENANCE: ICD-10-CM

## 2025-03-31 DIAGNOSIS — Z72.0 TOBACCO ABUSE: ICD-10-CM

## 2025-03-31 DIAGNOSIS — Z01.118 ABNORMAL EXAM OF RIGHT EAR: ICD-10-CM

## 2025-03-31 RX ORDER — BUPROPION HYDROCHLORIDE 150 MG/1
150 TABLET ORAL EVERY MORNING
Qty: 30 TABLET | Refills: 3 | Status: SHIPPED | OUTPATIENT
Start: 2025-03-31

## 2025-03-31 RX ORDER — AMLODIPINE AND BENAZEPRIL HYDROCHLORIDE 5; 20 MG/1; MG/1
1 CAPSULE ORAL DAILY
Qty: 90 CAPSULE | Refills: 3 | Status: SHIPPED | OUTPATIENT
Start: 2025-03-31 | End: 2026-05-05

## 2025-03-31 RX ORDER — OMEPRAZOLE 40 MG/1
40 CAPSULE, DELAYED RELEASE ORAL DAILY
Qty: 90 CAPSULE | Refills: 3 | Status: SHIPPED | OUTPATIENT
Start: 2025-03-31

## 2025-03-31 ASSESSMENT — PATIENT HEALTH QUESTIONNAIRE - PHQ9: CLINICAL INTERPRETATION OF PHQ2 SCORE: 0

## 2025-03-31 NOTE — TELEPHONE ENCOUNTER
Patient came in today saying that two of his prescriptions were the wrong dosage and needed them changed.The dosage he needs 5-5 mg capsule of amlodipien- benazepril. Then Omrptazole needs to be 20mg

## 2025-03-31 NOTE — LETTER
[facility]  Dariana Amor M.D.  [address]  [place] [address]  Fax: [phone]   Authorization for Release/Disclosure of   Protected Health Information   Name: OSMAR CERDA : 1970 SSN: [id number]   Address: [address] [place], [place] [address] [place] [address] Phone:    [phone] (home)    I authorize the entity listed below to release/disclose the PHI below to:   [facility]/Dariana Amor M.D. and Dariana Amor M.D.   Provider or Entity Name:  [facility]   Address   City, [facility], Zip:               [address] [place] [address], [place] [address]   Phone:  [phone]      Fax:      [phone]        Reason for request: continuity of care   Information to be released:    [ X ] LAST COLONOSCOPY,  including any PATH REPORT and follow-up  [ X ] LAST FIT/COLOGUARD RESULT [  ] LAST DEXA  [  ] LAST MAMMOGRAM  [  ] LAST PAP  [  ] LAST LABS [  ] RETINA EXAM REPORT  [  ] IMMUNIZATION RECORDS  [  ] Release all info      [  ] Check here and initial the line next to each item to release ALL health information INCLUDING  _____ Care and treatment for drug and / or alcohol abuse  _____ HIV testing, infection status, or AIDS  _____ Genetic Testing    DATES OF SERVICE OR TIME PERIOD TO BE DISCLOSED: _____________  I understand and acknowledge that:  * This Authorization may be revoked at any time by you in writing, except if your health information has already been used or disclosed.  * Your health information that will be used or disclosed as a result of you signing this authorization could be re-disclosed by the recipient. If this occurs, your re-disclosed health information may no longer be protected by State or Federal laws.  * You may refuse to sign this Authorization. Your refusal will not affect your ability to obtain treatment.  * This Authorization becomes effective upon signing and will  on (date) __________.      If no date is indicated, this Authorization will  one (1) year  from the signature date.    Name: Cuate Bravo    Signature: Continuity of care    Date:     3/31/2025       PLEASE FAX REQUESTED RECORDS BACK TO: [phone]

## 2025-03-31 NOTE — LETTER
[facility]  Dariana Amor M.D.  [address]  [place] [address]  Fax: [phone]   Authorization for Release/Disclosure of   Protected Health Information   Name: OSMAR CERDA : 1970 SSN: [id number]   Address: [address] [place], [place] [address] [place] [address] Phone:    [phone] (home)    I authorize the entity listed below to release/disclose the PHI below to:   [facility]/Dariana Amor M.D. and Dariana Amor M.D.   Provider or Entity Name:    [facility]    Address   City, State, Zip   Phone:      Fax:     Reason for request: continuity of care   Information to be released:    [ x ] LAST COLONOSCOPY,  including any PATH REPORT and follow-up  [  ] LAST FIT/COLOGUARD RESULT [  ] LAST DEXA  [  ] LAST MAMMOGRAM  [  ] LAST PAP  [  ] LAST LABS [  ] RETINA EXAM REPORT  [  ] IMMUNIZATION RECORDS  [  ] Release all info      [  ] Check here and initial the line next to each item to release ALL health information INCLUDING  _____ Care and treatment for drug and / or alcohol abuse  _____ HIV testing, infection status, or AIDS  _____ Genetic Testing    DATES OF SERVICE OR TIME PERIOD TO BE DISCLOSED: _____________  I understand and acknowledge that:  * This Authorization may be revoked at any time by you in writing, except if your health information has already been used or disclosed.  * Your health information that will be used or disclosed as a result of you signing this authorization could be re-disclosed by the recipient. If this occurs, your re-disclosed health information may no longer be protected by State or Federal laws.  * You may refuse to sign this Authorization. Your refusal will not affect your ability to obtain treatment.  * This Authorization becomes effective upon signing and will  on (date) __________.      If no date is indicated, this Authorization will  one (1) year from the signature date.    Name: Osmar Cerda  Signature: Date:   3/31/2025     PLEASE FAX  REQUESTED RECORDS BACK TO: [phone]

## 2025-03-31 NOTE — Clinical Note
REFERRAL APPROVAL NOTICE         Sent on March 31, 2025                   Cuate Altafchristine Bravo  [address] [place] [address]                   Dear Mr. Bravo,    After a careful review of the medical information and benefit coverage, [facility] has processed your referral. See below for additional details.    If applicable, you must be actively enrolled with your insurance for coverage of the authorized service. If you have any questions regarding your coverage, please contact your insurance directly.    REFERRAL INFORMATION   Referral #:  [id number]  Referred-To Provider    Referred-By Provider:  Otolaryngology    Dariana Amor M.D.   [facility]      [address]  [place] [address]  [phone] [address]  [place] [address]  [phone]    Referral Start Date:  03/31/2025  Referral End Date:   03/31/2026             SCHEDULING  If you do not already have an appointment, please call [phone] to make an appointment.     MORE INFORMATION  If you do not already have a [employer] account, sign up at: [email or web address]  You can access your medical information, make appointments, see lab results, billing information, and more.  If you have questions regarding this referral, please contact  the [facility] Referrals department at:             [phone]. Monday - Friday 8:00AM - 5:00PM.     Sincerely,    [facility]

## 2025-03-31 NOTE — PROGRESS NOTES
Subjective:     CC:   Chief Complaint   Patient presents with    [facility]    Gastrophageal Reflux    Hypertension    Otalgia     With drainage X 4 years        HPI:   Cuate presents today with  Verbal consent was acquired by the patient to use [employer] ambient listening note generation during this visit Yes   History of Present Illness  The patient presents for evaluation of hypertension, anxiety, and ear drainage.    He is currently on a regimen of amlodipine 10 mg for hypertension, with a supply sufficient for one month. He reports no peripheral edema or constipation, attributing the latter to dietary factors. He has been experiencing elevated blood pressure readings despite adherence to his medication schedule. He recalls a previous prescription of Lotrel, which he found beneficial. He has not discontinued his medication at any point. He also mentions that his blood pressure tends to rise during periods of stress, such as when he is preoccupied with relationship issues.    He has been a smoker for approximately 20 years, consuming an average of 5 cigarettes daily. He expresses a desire to quit smoking. He reports experiencing anxiety, which he attributes to his current life circumstances, including marital discord. He does not have a history of seizures or epilepsy.    He underwent ear surgery in 2016, during which his eardrum was inadvertently ruptured while removing a piece of earplug that had lodged in his ear. The surgeon attempted to repair the eardrum, but a residual piece of tissue was left over the eardrum, resulting in impaired hearing. He reports constant ear drainage, which is not associated with pain. He has not sought ENT consultation post-surgery. He describes a sensation of something crawling in his ear, which he manages by inserting a Q-tip into the ear canal to absorb the drainage, described as a yellowish liquid. This intervention provides temporary relief, but the symptoms have been  "progressively worsening. He also experiences tinnitus.    He takes omeprazole and it helps him. He has a few left and needs a refill.    He received a tetanus booster when he got out. He has received 2 doses of the COVID-19 vaccine at [facility]. He had a colonoscopy in 2020, which showed no polyps or abnormalities.    SOCIAL HISTORY  The patient admits to smoking on and off for about 20 years, currently smoking approximately 5 cigarettes a day.    MEDICATIONS  Current: Omeprazole, amlodipine.  Past: Lotrel.    IMMUNIZATIONS  He has received 2 doses of the COVID-19 vaccine at [facility].    Results         Problem   Gastroesophageal Reflux Disease Without Esophagitis    Chronic condition  Symptoms well controlled with omeprazole 40 mg daily   Denies:   - heartburn, epigastric pain.   - nausea, vomiting, or diarrhea  - dysphagia  - abnormal cough  - blood in the stool or dark tarry stools.  - early satiety  - tobacco use.       Situational Anxiety    New concern   Currently going through marital issues   ABEL score 9  Discussed risk and benefits of medications as he wants to try medication to help him at present      Tobacco Abuse    Chronic   On and off for more 20 years.   Currently active smoker, about 5 cig      Primary Hypertension    Chronic problem   Currently uncontrolled   Previously better controlled on amlodipine - benazepril 5-20 mg daily .   Recently went to urgent care for refill as he was not yet established with new pcp who gave only amlodipine which has helped only partially .  Would like to go back to previous medication as it helped better.           Health Maintenance: Completed    ROS:  ROS    Review of systems unremarkable except for concerns noted by patient or items listed.    Please see HPI for additional ROS.      Objective:     Exam:  BP (!) 152/86 (BP Location: Left arm, Patient Position: Sitting, BP Cuff Size: Adult)   Pulse 80   Temp 36.4 °C (97.6 °F) (Temporal)   Resp 16   Ht 1.803 m (5' 11\")   " Wt 96.9 kg (213 lb 9.6 oz)   SpO2 99%   BMI 29.79 kg/m²  Body mass index is 29.79 kg/m².    Physical Exam  Constitutional:       General: He is not in acute distress.     Appearance: Normal appearance.   HENT:      Head: Normocephalic.   Cardiovascular:      Rate and Rhythm: Normal rate and regular rhythm.      Pulses: Normal pulses.      Heart sounds: Normal heart sounds.   Pulmonary:      Effort: Pulmonary effort is normal.      Breath sounds: Normal breath sounds.   Skin:     General: Skin is warm.   Neurological:      Mental Status: He is alert and oriented to person, place, and time.   Psychiatric:         Mood and Affect: Mood normal.         Behavior: Behavior normal.             Labs: reviewed    Assessment & Plan:     55 y.o. male with the following -     1. Primary hypertension (Primary)  Chronic, uncontrolled at present  Denies Associated symptoms of chest pain, sob, headaches, dizziness/lightheadedness.   Blood pressure reading today was 152  Plan:  Switched from amlodipine to lotrel   - Prescription for Lotrel (combination medication) provided, with a supply for 90 days and 3 refills  - Advise to monitor blood pressure at home using a cuff: initially daily for a week, then weekly or bi-weekly once stable  - If blood pressure readings consistently exceed > 140, reach back to provider sooner otherwise f/u in 2 months    Plan:  Start - amlodipine-benazepril (LOTREL) 5-20 MG daily   Instructions provided   - check home BP regularly at least 1-2 times a week and keep log  - return to clinic in 3 months (or sooner if home BP is persistently elevated above goal).   - alcohol cessation   - regular exercises   - avoid illicit drugs and tobacco  - DASH diet , low salt diet   - weight loss.     - amlodipine-benazepril (LOTREL) 5-20 MG per capsule; Take 1 Capsule by mouth every day.  Dispense: 90 Capsule; Refill: 3    2. Gastroesophageal reflux disease without esophagitis  Chronic, stable  Appropriate counseling  given.  Discussed - weight loss, elevated the head of the bed, avoid common food triggers (fatty or fried foods, tomato sauce, alcohol, chocolate, mint, garlic, onion, and caffeine)  Recommended - smoking cessation, avoid excessive alcohol consumption.   RX: PPI   Discussed side effects --> Increased risks of vitamin/mineral deficiency, GI infection, and bone loss associated with PPI.   Patient verbalized understanding     - omeprazole (PRILOSEC) 40 MG delayed-release capsule; Take 1 Capsule by mouth every day.  Dispense: 90 Capsule; Refill: 3    3. Tobacco abuse  Chronic problem ,stable  Interested in quit   Smoking cessation counselin minutes including discussion of various products available to assist with this endeavor including nicotine replacement patch, gum, Chantix, Wellbutrin, support groups and therapy. Emphasized the importance of identifying and eliminating triggers. Medical and social consequences of continued tobacco use discussed.  Plan:  - buPROPion (WELLBUTRIN XL) 150 MG XL tablet; Take 1 Tablet by mouth every morning.  Dispense: 30 Tablet; Refill: 3    4. Situational anxiety  New problem, uncontrolled   Plan:  - Prescription for Wellbutrin initiated, with a supply for 30 days and 2 refills  - Advise to take medication in the morning around breakfast  - Discussed potential side effects: seizures,nausea, sleep issues  - If Wellbutrin is ineffective in managing mood, consider alternative medication    - buPROPion (WELLBUTRIN XL) 150 MG XL tablet; Take 1 Tablet by mouth every morning.  Dispense: 30 Tablet; Refill: 3    5. Ear discharge of right ear  6. Abnormal exam of right ear  Chronic, uncontrolled   Intermittent symptoms   Ear exam showed thickened yellowish tissue around the ear drum in right ear   - Referral to ENT specialist  - Advise to avoid inserting Q-tips into ears; use a cotton cloth for external cleaning  - Referral to ENT    7. Health care maintenance  - Comp Metabolic Panel;  Future  - Lipid Profile; Future  - CBC WITHOUT DIFFERENTIAL; Future  - TSH WITH REFLEX TO FT4; Future  - HEMOGLOBIN A1C; Future    8. Prostate cancer screening  - PROSTATE SPECIFIC AG SCREENING; Future      4. Medication management:  - Refill for omeprazole sent to ensure sufficient medication    5. Health maintenance:  - Advise to consider receiving pneumonia vaccine due to smoking history  - Request for records from [facility] regarding previous colonoscopy sent  - Lab work ordered    Follow-up:  - Patient to follow up in 2 months    PROCEDURE  The patient underwent ear surgery in 2016, during which his eardrum was inadvertently ruptured while removing a piece of earplug that had lodged in his ear. The surgeon attempted to repair the eardrum, but a residual piece of tissue was left over the eardrum, resulting in impaired hearing.        2 months f/u for anxiety and HTN    Please note that this dictation was created using voice recognition software. I have made every reasonable attempt to correct obvious errors, but I expect that there are errors of grammar and possibly content that I did not discover before finalizing the note.

## 2025-04-01 DIAGNOSIS — K21.9 GASTROESOPHAGEAL REFLUX DISEASE WITHOUT ESOPHAGITIS: ICD-10-CM

## 2025-04-08 RX ORDER — OMEPRAZOLE 20 MG/1
20 CAPSULE, DELAYED RELEASE ORAL DAILY
Qty: 90 CAPSULE | Refills: 3 | Status: SHIPPED
Start: 2025-04-08 | End: 2025-04-14

## 2025-04-11 DIAGNOSIS — K21.9 GASTROESOPHAGEAL REFLUX DISEASE WITHOUT ESOPHAGITIS: ICD-10-CM

## 2025-04-14 DIAGNOSIS — K21.9 GASTROESOPHAGEAL REFLUX DISEASE WITHOUT ESOPHAGITIS: ICD-10-CM

## 2025-04-14 RX ORDER — OMEPRAZOLE 20 MG/1
20 CAPSULE, DELAYED RELEASE ORAL DAILY
Qty: 30 CAPSULE | Refills: 3 | Status: SHIPPED | OUTPATIENT
Start: 2025-04-14 | End: 2025-04-15

## 2025-04-15 RX ORDER — LANSOPRAZOLE 30 MG/1
30 CAPSULE, DELAYED RELEASE ORAL DAILY
Qty: 90 CAPSULE | Refills: 0 | Status: SHIPPED | OUTPATIENT
Start: 2025-04-15 | End: 2025-04-16

## 2025-04-16 ENCOUNTER — NON-PROVIDER VISIT (OUTPATIENT)
Dept: MEDICAL GROUP | Facility: MEDICAL CENTER | Age: 55
End: 2025-04-16
Payer: COMMERCIAL

## 2025-04-16 VITALS — SYSTOLIC BLOOD PRESSURE: 130 MMHG | DIASTOLIC BLOOD PRESSURE: 78 MMHG

## 2025-04-16 RX ORDER — OMEPRAZOLE 20 MG/1
20 CAPSULE, DELAYED RELEASE ORAL DAILY
Qty: 30 CAPSULE | Refills: 3 | Status: SHIPPED | OUTPATIENT
Start: 2025-04-16 | End: 2025-04-16 | Stop reason: SDUPTHER

## 2025-04-16 RX ORDER — OMEPRAZOLE 20 MG/1
20 CAPSULE, DELAYED RELEASE ORAL DAILY
Qty: 30 CAPSULE | Refills: 1 | Status: SHIPPED | OUTPATIENT
Start: 2025-04-16

## 2025-04-16 NOTE — PROGRESS NOTES
Cuate Bravo is a 55 y.o. male here for a non-provider visit for blood pressure check          If abnormal, was the Registered Nurse (office provider if RN is unavailable) notified today? Yes    Routed to PCP/Requested Provider? Yes

## 2025-04-16 NOTE — TELEPHONE ENCOUNTER
Received request via: Patient    Was the patient seen in the last year in this department? Yes    Does the patient have an active prescription (recently filled or refills available) for medication(s) requested? No    Does the patient have alf Plus and need 100-day supply? (This applies to ALL medications) Patient does not have SCP

## 2025-05-27 ENCOUNTER — OFFICE VISIT (OUTPATIENT)
Dept: MEDICAL GROUP | Facility: MEDICAL CENTER | Age: 55
End: 2025-05-27
Payer: COMMERCIAL

## 2025-05-27 VITALS
DIASTOLIC BLOOD PRESSURE: 78 MMHG | TEMPERATURE: 98.9 F | BODY MASS INDEX: 28.86 KG/M2 | HEIGHT: 71 IN | HEART RATE: 78 BPM | WEIGHT: 206.13 LBS | RESPIRATION RATE: 15 BRPM | OXYGEN SATURATION: 97 % | SYSTOLIC BLOOD PRESSURE: 138 MMHG

## 2025-05-27 DIAGNOSIS — Z72.0 TOBACCO ABUSE: ICD-10-CM

## 2025-05-27 DIAGNOSIS — K21.9 GASTROESOPHAGEAL REFLUX DISEASE WITHOUT ESOPHAGITIS: ICD-10-CM

## 2025-05-27 DIAGNOSIS — I10 PRIMARY HYPERTENSION: ICD-10-CM

## 2025-05-27 DIAGNOSIS — H91.11 PRESBYCUSIS OF RIGHT EAR, UNSPECIFIED HEARING STATUS ON CONTRALATERAL SIDE: Primary | ICD-10-CM

## 2025-05-27 DIAGNOSIS — R00.0 RACING HEART BEAT: ICD-10-CM

## 2025-05-27 DIAGNOSIS — H60.391 OTHER INFECTIVE ACUTE OTITIS EXTERNA OF RIGHT EAR: ICD-10-CM

## 2025-05-27 PROBLEM — H60.90 OTITIS EXTERNA: Status: ACTIVE | Noted: 2025-05-27

## 2025-05-27 PROCEDURE — 99214 OFFICE O/P EST MOD 30 MIN: CPT | Performed by: BEHAVIOR ANALYST

## 2025-05-27 PROCEDURE — 3075F SYST BP GE 130 - 139MM HG: CPT | Performed by: BEHAVIOR ANALYST

## 2025-05-27 PROCEDURE — 3078F DIAST BP <80 MM HG: CPT | Performed by: BEHAVIOR ANALYST

## 2025-05-27 RX ORDER — OMEPRAZOLE 20 MG/1
20 CAPSULE, DELAYED RELEASE ORAL DAILY
Qty: 90 CAPSULE | Refills: 3 | Status: SHIPPED | OUTPATIENT
Start: 2025-05-27

## 2025-05-27 RX ORDER — AMLODIPINE AND BENAZEPRIL HYDROCHLORIDE 5; 10 MG/1; MG/1
1 CAPSULE ORAL DAILY
Qty: 90 CAPSULE | Refills: 3 | Status: SHIPPED | OUTPATIENT
Start: 2025-05-27 | End: 2026-07-01

## 2025-05-27 RX ORDER — OFLOXACIN 3 MG/ML
5 SOLUTION AURICULAR (OTIC) DAILY
Qty: 10 ML | Refills: 0 | Status: SHIPPED | OUTPATIENT
Start: 2025-05-27 | End: 2025-05-27

## 2025-05-27 RX ORDER — OFLOXACIN 3 MG/ML
10 SOLUTION AURICULAR (OTIC) DAILY
Qty: 10 ML | Refills: 1 | Status: SHIPPED | OUTPATIENT
Start: 2025-05-27 | End: 2025-06-06

## 2025-05-27 ASSESSMENT — ENCOUNTER SYMPTOMS
SHORTNESS OF BREATH: 0
PALPITATIONS: 1

## 2025-05-27 NOTE — PROGRESS NOTES
Subjective:     CC:    Chief Complaint   Patient presents with    Other     PCP    Rt ear infection x 2016    Draining and odor   ENT refer him to somewhere else     Hypertension     Pt takes BP medication every mornings     Anxiety     Not taking the medication Wellbutrin     Medication Refill     Amlodipine- benazepril 5 -10 mg          HISTORY OF THE PRESENT ILLNESS:   Cuate presents today with    History of Present Illness  The patient came in for a follow-up on his blood pressure, right ear infection, and to discuss his medications.    He mentioned that sometimes his heart starts racing, which he thinks might be because of his blood pressure medicine. These episodes happen occasionally, usually within the first few hours after taking his BP medication. He hasn't been checking his blood pressure or HR when this happens. He was on a different blood pressure medicine before, but it didn't work, so now he's on a combination of amlodipine and benazepril .    He's been dealing with drainage from his right ear since 2016, and he believes it's infected. The drainage is bad enough to wake him up at night and has a bad smell. He hasn't used any ear drops for it. He remembers seeing Dr. Deleon, who found a flap over his eardrum. He also has a history of a hole in his eardrum from using earplugs a lot, which caused an infection and bleeding. He had a procedure to fix the hole, but it made things worse, including his hearing. He was referred to [facility] but was told they couldn't see him, so he needs another referral.    He's currently taking omeprazole for heartburn and needs a refill.    He tried Wellbutrin to help quit smoking but stopped because it made him feel funny.    PAST SURGICAL HISTORY:  - Procedure to repair eardrum perforation        Problem   Otitis Externa       Current Outpatient Medications   Medication Sig    omeprazole (PRILOSEC) 20 MG delayed-release capsule Take 1 Capsule by mouth every  "day.    amlodipine-benazepril (LOTREL) 5-10 MG per capsule Take 1 Capsule by mouth every day.    ofloxacin otic sol (FLOXIN OTIC) 0.3 % Solution Administer 5 Drops into the right ear every day for 10 days. Administer drops to both ears.          ROS: See HPI  Review of Systems   Constitutional:  Negative for malaise/fatigue.   HENT:  Positive for ear discharge and hearing loss.    Respiratory:  Negative for shortness of breath.    Cardiovascular:  Positive for palpitations. Negative for chest pain.         Objective:     Exam: /78 (BP Location: Left arm, Patient Position: Sitting, BP Cuff Size: Adult long)   Pulse 78   Temp 37.2 °C (98.9 °F) (Temporal)   Resp 15   Ht 1.803 m (5' 11\")   Wt 93.5 kg (206 lb 2.1 oz)   SpO2 97%   BMI 28.75 kg/m²   Body mass index is 28.75 kg/m².    Physical Exam  Constitutional:       Appearance: Normal appearance.   HENT:      Ears:      Comments: Right external canal: Thick, yellowish white exudate completely covering right TM; no significant edema  Eyes:      Extraocular Movements: Extraocular movements intact.   Cardiovascular:      Rate and Rhythm: Normal rate and regular rhythm.   Pulmonary:      Effort: Pulmonary effort is normal. No respiratory distress.      Breath sounds: Normal breath sounds.   Neurological:      Mental Status: He is alert.                Assessment & Plan:     55 y.o. male with the following -     1. Primary hypertension  - chronic, controlled.   - Blood pressure has shown improvement since the last visit in 03/2025.  - Heart rate today is 78, previously 80 in 03/2025.  - Advised to monitor blood pressure and heart rate during episodes of perceived tachycardia and report any significant deviations.  - Prescription for a 90-day supply of amlodipine and benazepril provided.  - amlodipine-benazepril (LOTREL) 5-10 MG per capsule; Take 1 Capsule by mouth every day.  Dispense: 90 Capsule; Refill: 3    2. Racing heart beat  - new problem.   - check HR " and BP when episodes occur and discuss at next office visit.     3. Other infective acute otitis externa of right ear  - chronic problem, persistent  - Presence of yellowish white discharge from the right ear suggests a bacterial infection.  - Prescription for ofloxacin otic drops provided, with instructions to administer 10 drops in the affected ear daily for 10 days.  - Referral to an ENT specialist initiated.  - Referral to ENT  - ofloxacin otic sol (FLOXIN OTIC) 0.3 % Solution; Administer 10 Drops into the right ear every day for 10 days. Administer drops to both ears.  Dispense: 10 mL; Refill: 0    4. Tobacco abuse  - chronic problem.   - did not like wellbutrin due to adverse effects.   - discuss alternatives at next visit.     5. Presbycusis of right ear, unspecified hearing status on contralateral side  - Referral to ENT    6. Gastroesophageal reflux disease without esophagitis  - omeprazole (PRILOSEC) 20 MG delayed-release capsule; Take 1 Capsule by mouth every day.  Dispense: 90 Capsule; Refill: 3        Assessment & Plan          Return in about 2 weeks (around 6/10/2025) for Symptoms check.    Please note that this dictation was created using voice recognition software. I have made every reasonable attempt to correct obvious errors, but I expect that there are errors of grammar and possibly content that I did not discover before finalizing the note.

## 2025-06-03 NOTE — Clinical Note
REFERRAL APPROVAL NOTICE         Sent on Corinne 3, 2025                   Cuate Altaf Alvarezarjun  [address] [place] [address]                   Dear Mr. Bravo,    After a careful review of the medical information and benefit coverage, [facility] has processed your referral. See below for additional details.    If applicable, you must be actively enrolled with your insurance for coverage of the authorized service. If you have any questions regarding your coverage, please contact your insurance directly.    REFERRAL INFORMATION   Referral #:  [id number]  Referred-To Provider    Referred-By Provider:  Otolaryngology    ALFREDO Molina   [facility]      [address]  [address]  [place] [address]  [phone] [address]  [place] [address]  [phone]    Referral Start Date:  05/27/2025  Referral End Date:   05/27/2026             SCHEDULING  If you do not already have an appointment, please call [phone] to make an appointment.     MORE INFORMATION  If you do not already have a [employer] account, sign up at: [email or web address]  You can access your medical information, make appointments, see lab results, billing information, and more.  If you have questions regarding this referral, please contact  the [facility] Referrals department at:             [phone]. Monday - Friday 8:00AM - 5:00PM.     Sincerely,    [facility]

## 2025-06-25 ENCOUNTER — OFFICE VISIT (OUTPATIENT)
Dept: MEDICAL GROUP | Facility: MEDICAL CENTER | Age: 55
End: 2025-06-25
Payer: COMMERCIAL

## 2025-06-25 VITALS
SYSTOLIC BLOOD PRESSURE: 130 MMHG | HEIGHT: 71 IN | BODY MASS INDEX: 28.84 KG/M2 | HEART RATE: 76 BPM | TEMPERATURE: 97.2 F | OXYGEN SATURATION: 98 % | DIASTOLIC BLOOD PRESSURE: 80 MMHG | WEIGHT: 206 LBS

## 2025-06-25 DIAGNOSIS — H60.41 CHOLESTEATOMA OF RIGHT EXTERNAL AUDITORY CANAL: Primary | ICD-10-CM

## 2025-06-25 DIAGNOSIS — Z72.0 TOBACCO ABUSE: ICD-10-CM

## 2025-06-25 DIAGNOSIS — H60.61 CHRONIC OTITIS EXTERNA OF RIGHT EAR, UNSPECIFIED TYPE: ICD-10-CM

## 2025-06-25 PROCEDURE — 3079F DIAST BP 80-89 MM HG: CPT | Performed by: STUDENT IN AN ORGANIZED HEALTH CARE EDUCATION/TRAINING PROGRAM

## 2025-06-25 PROCEDURE — 3075F SYST BP GE 130 - 139MM HG: CPT | Performed by: STUDENT IN AN ORGANIZED HEALTH CARE EDUCATION/TRAINING PROGRAM

## 2025-06-25 PROCEDURE — 99214 OFFICE O/P EST MOD 30 MIN: CPT | Performed by: STUDENT IN AN ORGANIZED HEALTH CARE EDUCATION/TRAINING PROGRAM

## 2025-06-25 RX ORDER — OFLOXACIN 3 MG/ML
5 SOLUTION AURICULAR (OTIC) DAILY
Qty: 10 ML | Refills: 0 | Status: SHIPPED | OUTPATIENT
Start: 2025-06-25 | End: 2025-07-05

## 2025-06-25 NOTE — PROGRESS NOTES
Subjective:     CC:   Chief Complaint   Patient presents with    Follow-Up     2 wk f/u on ear       HPI:   Cuate presents today with  Verbal consent was acquired by the patient to use [employer] ambient listening note generation during this visit Yes   History of Present Illness  The patient presents for evaluation of ear drainage and smoking cessation.    He reports persistent ear drainage accompanied by an unpleasant odor. He experiences itching in the morning, which he manages by using Q-tips. The discharge is described as brownish and thick, with occasional crusting and scabbing. He does not experience any pain but notes increased drainage when lying on one side. His hearing is generally good, although he experiences a popping sensation when exposed to certain tones or music. He also reports a muffled sensation in his ear. He has not observed any significant changes with the use of ear drops. He has a history of ear surgery due to a piece of an earplug getting lodged in his ear, leading to infection and bleeding. During the surgical intervention, his eardrum was punctured.    He has expressed a desire to quit smoking and currently consumes approximately five cigarettes daily. He has not yet tried nicotine patches or gums but is open to using the gum. He has previously used Wellbutrin but discontinued it due to side effects, including a sensation of being in a cloud. He has not tried Chantix and is not interested in medication-based treatments.    PAST SURGICAL HISTORY:  Ear surgery due to earplug-related infection and eardrum puncture.    SOCIAL HISTORY  - Smokes about 5 cigarettes a day           Health Maintenance: Completed    ROS:  ROS    Review of systems unremarkable except for concerns noted by patient or items listed.    Please see HPI for additional ROS.      Objective:     Exam:  /80 (BP Location: Left arm, Patient Position: Sitting, BP Cuff Size: Adult long)   Pulse 76   Temp 36.2 °C (97.2  "°F) (Temporal)   Ht 1.803 m (5' 11\")   Wt 93.4 kg (206 lb)   SpO2 98%   BMI 28.73 kg/m²  Body mass index is 28.73 kg/m².    Physical Exam  Constitutional:       General: He is not in acute distress.     Appearance: Normal appearance.   HENT:      Head: Normocephalic.      Left Ear: Tympanic membrane, ear canal and external ear normal. There is no impacted cerumen.      Ears:      Comments: Right external ear shows whitish growth /colored layer on the Tm and roof of external ear canal . Yellowish brown discharge with malodor present . No free fluid   Cardiovascular:      Rate and Rhythm: Normal rate and regular rhythm.      Pulses: Normal pulses.      Heart sounds: Normal heart sounds.   Pulmonary:      Effort: Pulmonary effort is normal.      Breath sounds: Normal breath sounds.   Skin:     General: Skin is warm.   Neurological:      Mental Status: He is alert and oriented to person, place, and time.   Psychiatric:         Mood and Affect: Mood normal.         Behavior: Behavior normal.             Labs: reviewed    Assessment & Plan:     55 y.o. male with the following -     1. Cholesteatoma of right external auditory canal (Primary)  Chronic   Possible keratin debris - Consideration of cholesteatoma, but in external ear  - Referral to an ENT specialist for further evaluation and management  - CT-POST-FOSSA-EAR W/O; Future  - CT-POST-FOSSA-EAR W/O; Future    2. 3. Chronic otitis externa of right ear, unspecified type  Chronic,uncontrolled  - Presence of a white layer noted, discharge present   ? Fungal or bacterial. Or erosion of canal wall due to cholesteatoma.  Given the foul smell possible bacterial   Plan:  - ofloxacin otic sol (FLOXIN OTIC) 0.3 % Solution; Administer 5 Drops into affected ear(s) every day for 10 days. Administer drops to both ears.  Dispense: 10 mL; Refill: 0      3. Tobacco abuse  Chronic problem  - Informed that Wellbutrin and Chantix are approved medications for smoking cessation  - " Declined the use of Wellbutrin and Chantix due to previous side effects and personal preference  - not tolerated wellbutrin  - Advised to use 7 mg nicotine patches daily, applying them after showering and removing them after 24 hours  - Recommended the use of nicotine gum for additional support  Plan:  - nicotine (NICODERM) 7 MG/24HR PATCH 24 HR; Place 1 Patch on the skin every 24 hours.  Dispense: 30 Patch; Refill: 2  - nicotine polacrilex (NICORETTE) 2 MG Gum; Take 1 Each by mouth as needed for Smoking Cessation.  Dispense: 120 Each; Refill: 2      I spent a total of 30+ minutes with record review, exam, communication with the patient, communication with other providers, and documentation of this encounter.          Please note that this dictation was created using voice recognition software. I have made every reasonable attempt to correct obvious errors, but I expect that there are errors of grammar and possibly content that I did not discover before finalizing the note.

## 2025-07-14 ENCOUNTER — HOSPITAL ENCOUNTER (OUTPATIENT)
Dept: RADIOLOGY | Facility: MEDICAL CENTER | Age: 55
End: 2025-07-14
Attending: STUDENT IN AN ORGANIZED HEALTH CARE EDUCATION/TRAINING PROGRAM
Payer: COMMERCIAL

## 2025-07-14 DIAGNOSIS — H60.61 CHRONIC OTITIS EXTERNA OF RIGHT EAR, UNSPECIFIED TYPE: ICD-10-CM

## 2025-07-14 DIAGNOSIS — H60.41 CHOLESTEATOMA OF RIGHT EXTERNAL AUDITORY CANAL: ICD-10-CM

## 2025-07-14 PROCEDURE — 70480 CT ORBIT/EAR/FOSSA W/O DYE: CPT

## 2025-07-15 ENCOUNTER — RESULTS FOLLOW-UP (OUTPATIENT)
Dept: MEDICAL GROUP | Facility: MEDICAL CENTER | Age: 55
End: 2025-07-15
Payer: COMMERCIAL

## 2025-07-19 PROCEDURE — 99283 EMERGENCY DEPT VISIT LOW MDM: CPT

## 2025-07-20 ENCOUNTER — HOSPITAL ENCOUNTER (EMERGENCY)
Facility: MEDICAL CENTER | Age: 55
End: 2025-07-20
Attending: EMERGENCY MEDICINE
Payer: COMMERCIAL

## 2025-07-20 ENCOUNTER — PHARMACY VISIT (OUTPATIENT)
Dept: PHARMACY | Facility: MEDICAL CENTER | Age: 55
End: 2025-07-20
Payer: COMMERCIAL

## 2025-07-20 VITALS
HEART RATE: 90 BPM | BODY MASS INDEX: 27.84 KG/M2 | RESPIRATION RATE: 18 BRPM | DIASTOLIC BLOOD PRESSURE: 98 MMHG | SYSTOLIC BLOOD PRESSURE: 163 MMHG | HEIGHT: 71 IN | TEMPERATURE: 96.8 F | WEIGHT: 198.85 LBS | OXYGEN SATURATION: 96 %

## 2025-07-20 VITALS
OXYGEN SATURATION: 97 % | HEART RATE: 97 BPM | SYSTOLIC BLOOD PRESSURE: 179 MMHG | DIASTOLIC BLOOD PRESSURE: 94 MMHG | BODY MASS INDEX: 27.98 KG/M2 | TEMPERATURE: 98 F | RESPIRATION RATE: 16 BRPM | WEIGHT: 200.62 LBS

## 2025-07-20 DIAGNOSIS — I10 PRIMARY HYPERTENSION: ICD-10-CM

## 2025-07-20 DIAGNOSIS — T46.4X5A ANGIOEDEMA DUE TO ANGIOTENSIN CONVERTING ENZYME INHIBITOR (ACE-I): Primary | ICD-10-CM

## 2025-07-20 DIAGNOSIS — T78.3XXA ANGIOEDEMA DUE TO ANGIOTENSIN CONVERTING ENZYME INHIBITOR (ACE-I): Primary | ICD-10-CM

## 2025-07-20 LAB
ABO GROUP BLD: NORMAL
ANION GAP SERPL CALC-SCNC: 15 MMOL/L (ref 7–16)
BASOPHILS # BLD AUTO: 0.3 % (ref 0–1.8)
BASOPHILS # BLD: 0.04 K/UL (ref 0–0.12)
BUN SERPL-MCNC: 12 MG/DL (ref 8–22)
CALCIUM SERPL-MCNC: 9.1 MG/DL (ref 8.5–10.5)
CHLORIDE SERPL-SCNC: 102 MMOL/L (ref 96–112)
CO2 SERPL-SCNC: 21 MMOL/L (ref 20–33)
CREAT SERPL-MCNC: 0.87 MG/DL (ref 0.5–1.4)
EOSINOPHIL # BLD AUTO: 0.08 K/UL (ref 0–0.51)
EOSINOPHIL NFR BLD: 0.6 % (ref 0–6.9)
ERYTHROCYTE [DISTWIDTH] IN BLOOD BY AUTOMATED COUNT: 45.8 FL (ref 35.9–50)
GFR SERPLBLD CREATININE-BSD FMLA CKD-EPI: 102 ML/MIN/1.73 M 2
GLUCOSE SERPL-MCNC: 61 MG/DL (ref 65–99)
HCT VFR BLD AUTO: 49.6 % (ref 42–52)
HGB BLD-MCNC: 16.9 G/DL (ref 14–18)
IMM GRANULOCYTES # BLD AUTO: 0.06 K/UL (ref 0–0.11)
IMM GRANULOCYTES NFR BLD AUTO: 0.5 % (ref 0–0.9)
LYMPHOCYTES # BLD AUTO: 2.36 K/UL (ref 1–4.8)
LYMPHOCYTES NFR BLD: 18.3 % (ref 22–41)
MCH RBC QN AUTO: 30.8 PG (ref 27–33)
MCHC RBC AUTO-ENTMCNC: 34.1 G/DL (ref 32.3–36.5)
MCV RBC AUTO: 90.3 FL (ref 81.4–97.8)
MONOCYTES # BLD AUTO: 1.44 K/UL (ref 0–0.85)
MONOCYTES NFR BLD AUTO: 11.1 % (ref 0–13.4)
NEUTROPHILS # BLD AUTO: 8.95 K/UL (ref 1.82–7.42)
NEUTROPHILS NFR BLD: 69.2 % (ref 44–72)
NRBC # BLD AUTO: 0 K/UL
NRBC BLD-RTO: 0 /100 WBC (ref 0–0.2)
PLATELET # BLD AUTO: 232 K/UL (ref 164–446)
PMV BLD AUTO: 10.9 FL (ref 9–12.9)
POTASSIUM SERPL-SCNC: 3.8 MMOL/L (ref 3.6–5.5)
RBC # BLD AUTO: 5.49 M/UL (ref 4.7–6.1)
RH BLD: NORMAL
SODIUM SERPL-SCNC: 138 MMOL/L (ref 135–145)
WBC # BLD AUTO: 12.9 K/UL (ref 4.8–10.8)

## 2025-07-20 PROCEDURE — 85025 COMPLETE CBC W/AUTO DIFF WBC: CPT

## 2025-07-20 PROCEDURE — 99283 EMERGENCY DEPT VISIT LOW MDM: CPT

## 2025-07-20 PROCEDURE — 99282 EMERGENCY DEPT VISIT SF MDM: CPT

## 2025-07-20 PROCEDURE — 86900 BLOOD TYPING SEROLOGIC ABO: CPT

## 2025-07-20 PROCEDURE — 86901 BLOOD TYPING SEROLOGIC RH(D): CPT

## 2025-07-20 PROCEDURE — RXMED WILLOW AMBULATORY MEDICATION CHARGE: Performed by: EMERGENCY MEDICINE

## 2025-07-20 PROCEDURE — 80048 BASIC METABOLIC PNL TOTAL CA: CPT

## 2025-07-20 PROCEDURE — 302449 STATCHG TRIAGE ONLY (STATISTIC)

## 2025-07-20 RX ORDER — FAMOTIDINE 20 MG/1
20 TABLET, FILM COATED ORAL 2 TIMES DAILY
Qty: 10 TABLET | Refills: 0 | Status: SHIPPED | OUTPATIENT
Start: 2025-07-20 | End: 2025-07-20

## 2025-07-20 RX ORDER — PREDNISONE 20 MG/1
60 TABLET ORAL DAILY
Qty: 15 TABLET | Refills: 0 | Status: SHIPPED | OUTPATIENT
Start: 2025-07-20 | End: 2025-07-20

## 2025-07-20 RX ORDER — FAMOTIDINE 20 MG/1
20 TABLET, FILM COATED ORAL 2 TIMES DAILY
Qty: 10 TABLET | Refills: 0 | Status: SHIPPED | OUTPATIENT
Start: 2025-07-20 | End: 2025-07-25

## 2025-07-20 RX ORDER — SODIUM CHLORIDE 9 MG/ML
INJECTION, SOLUTION INTRAVENOUS CONTINUOUS
Status: DISCONTINUED | OUTPATIENT
Start: 2025-07-20 | End: 2025-07-20 | Stop reason: HOSPADM

## 2025-07-20 RX ORDER — AMLODIPINE BESYLATE 10 MG/1
10 TABLET ORAL DAILY
Qty: 100 TABLET | Refills: 0 | Status: SHIPPED | OUTPATIENT
Start: 2025-07-20 | End: 2026-08-24

## 2025-07-20 RX ORDER — DIPHENHYDRAMINE HCL 25 MG
25 CAPSULE ORAL EVERY 6 HOURS PRN
Qty: 20 CAPSULE | Refills: 0 | Status: SHIPPED | OUTPATIENT
Start: 2025-07-20 | End: 2025-07-25

## 2025-07-20 RX ORDER — EPINEPHRINE 1 MG/ML(1)
0.3 AMPUL (ML) INJECTION ONCE
Status: DISCONTINUED | OUTPATIENT
Start: 2025-07-20 | End: 2025-07-20 | Stop reason: HOSPADM

## 2025-07-20 RX ORDER — PREDNISONE 20 MG/1
60 TABLET ORAL DAILY
Qty: 15 TABLET | Refills: 0 | Status: SHIPPED | OUTPATIENT
Start: 2025-07-20 | End: 2025-07-25

## 2025-07-20 NOTE — ED NOTES
Cuate Bravo expresses desire to leave. Risks in leaving ED before treatment given and diagnostics completed discussed with patient. EP completed AMA form and patient  signed form.

## 2025-07-20 NOTE — ED TRIAGE NOTES
Pt to triage .  Chief Complaint   Patient presents with    Facial Swelling     Pt c/o facial swelling denies any difficulty breathing or swallowing

## 2025-07-20 NOTE — ED NOTES
Med rec partially complete per historical fills. Per pt he is leaving and told me I do not have to do this.    08-Jul-2020 21:07

## 2025-07-20 NOTE — ED PROVIDER NOTES
ED Provider Note    CHIEF COMPLAINT  Chief Complaint   Patient presents with    Facial Swelling     Pt woke up with facial swelling since 1900. Drank about three shots of vodka prior to taking his nap this afternoon at 1400. Denies any food or any other allergies. Denies any pain, itching, SOB, or throat tightness. Pt states he did not eat any food today besides the 3 shots of vodka; which pt drinks often. Respirations even and unlabored. Speaking in clear and complete sentences. NAD.        EXTERNAL RECORDS REVIEWED  Outpatient Notes patient has continuation of care with the: [facility].  Outpatient office notes 5/27/2025 6/25/2025 reviewed.    HPI/ROS  LIMITATION TO HISTORY   Select: Intoxication  OUTSIDE HISTORIAN(S):  none    Cuate Bravo is a 55 y.o. male who presents to the emerged part with facial swelling.  States that he and his partner had been drinking earlier today which is not atypical for them.  However he had gone to sleep and then awoke with significant facial swelling.  Denies any significant difficulty breathing or swallowing or even talking but does have significant  swelling to the face which does cause some mild discomfort.    No prior history of same.  No known history of congenital angioedema.  He is on blood pressure medication which is a dual agent to include amlodipine and benazepril.    PAST MEDICAL HISTORY       SURGICAL HISTORY  patient denies any surgical history    FAMILY HISTORY  Family History   Problem Relation Age of Onset    Other Mother         unknown    Seizures Father        SOCIAL HISTORY  Social History     Tobacco Use    Smoking status: Some Days     Current packs/day: 0.25     Types: Cigarettes    Smokeless tobacco: Never   Vaping Use    Vaping status: Never Used   Substance and Sexual Activity    Alcohol use: Yes     Alcohol/week: 3.0 oz     Types: 1 Glasses of wine, 1 Cans of beer, 3 Shots of liquor per week     Comment: weekly    Drug use: No    Sexual activity:  "Not on file       CURRENT MEDICATIONS  Home Medications       Reviewed by Trish Hoang R.N. (Registered Nurse) on 07/19/25 at 2322  Med List Status: Not Addressed     Medication Last Dose Status   amlodipine-benazepril (LOTREL) 5-10 MG per capsule  Active   nicotine (NICODERM) 7 MG/24HR PATCH 24 HR  Active   nicotine polacrilex (NICORETTE) 2 MG Gum  Active   omeprazole (PRILOSEC) 20 MG delayed-release capsule  Active                    ALLERGIES  Allergies[1]    PHYSICAL EXAM  VITAL SIGNS: BP (!) 163/98   Pulse 90   Temp 36 °C (96.8 °F) (Tympanic)   Resp 18   Ht 1.803 m (5' 11\")   Wt 90.2 kg (198 lb 13.7 oz)   SpO2 96%   BMI 27.73 kg/m²      Pulse ox interpretation: I interpret this pulse ox as normal.  Constitutional: Alert in no apparent distress.  HENT: No signs of trauma, Bilateral external ears normal, Nose normal.  Significant soft tissue swelling to lower face to include upper and lower lips.  Appearance to favor angioedema.  Posterior pharynx is clear.  No tongue or uvular enlargement or deviation.  Good phonation of voice and toleration of secretions.  Eyes: Pupils are equal and reactive  Neck: Normal range of motion, No tenderness, Supple, trachea is midline  Cardiovascular: Regular rate and rhythm, no murmurs.   Thorax & Lungs: Normal breath sounds, No respiratory distress, No wheezing  Skin: Warm, Dry  Musculoskeletal: Good range of motion in all major joints. No tenderness to palpation or major deformities noted.   Neurologic: Alert , Normal motor function, Normal sensory function, No focal deficits noted.   Psychiatric: Affect normal, Judgment normal, Mood normal.       Results for orders placed or performed during the hospital encounter of 07/20/25   CBC WITH DIFFERENTIAL    Collection Time: 07/20/25  1:15 AM   Result Value Ref Range    WBC 12.9 (H) 4.8 - 10.8 K/uL    RBC 5.49 4.70 - 6.10 M/uL    Hemoglobin 16.9 14.0 - 18.0 g/dL    Hematocrit 49.6 42.0 - 52.0 %    MCV 90.3 81.4 - 97.8 fL    " MCH 30.8 27.0 - 33.0 pg    MCHC 34.1 32.3 - 36.5 g/dL    RDW 45.8 35.9 - 50.0 fL    Platelet Count 232 164 - 446 K/uL    MPV 10.9 9.0 - 12.9 fL    Neutrophils-Polys 69.20 44.00 - 72.00 %    Lymphocytes 18.30 (L) 22.00 - 41.00 %    Monocytes 11.10 0.00 - 13.40 %    Eosinophils 0.60 0.00 - 6.90 %    Basophils 0.30 0.00 - 1.80 %    Immature Granulocytes 0.50 0.00 - 0.90 %    Nucleated RBC 0.00 0.00 - 0.20 /100 WBC    Neutrophils (Absolute) 8.95 (H) 1.82 - 7.42 K/uL    Lymphs (Absolute) 2.36 1.00 - 4.80 K/uL    Monos (Absolute) 1.44 (H) 0.00 - 0.85 K/uL    Eos (Absolute) 0.08 0.00 - 0.51 K/uL    Baso (Absolute) 0.04 0.00 - 0.12 K/uL    Immature Granulocytes (abs) 0.06 0.00 - 0.11 K/uL    NRBC (Absolute) 0.00 K/uL   BASIC METABOLIC PANEL    Collection Time: 07/20/25  1:15 AM   Result Value Ref Range    Sodium 138 135 - 145 mmol/L    Potassium 3.8 3.6 - 5.5 mmol/L    Chloride 102 96 - 112 mmol/L    Co2 21 20 - 33 mmol/L    Glucose 61 (L) 65 - 99 mg/dL    Bun 12 8 - 22 mg/dL    Creatinine 0.87 0.50 - 1.40 mg/dL    Calcium 9.1 8.5 - 10.5 mg/dL    Anion Gap 15.0 7.0 - 16.0   ESTIMATED GFR    Collection Time: 07/20/25  1:15 AM   Result Value Ref Range    GFR (CKD-EPI) 102 >60 mL/min/1.73 m 2           COURSE & MEDICAL DECISION MAKING    ASSESSMENT, COURSE AND PLAN  Care Narrative: 55-year-old male presented the emerged part with facial swelling.  High suspicion for ACE induced angioedema.  Will discuss pharmacologic agents available at this institution for care    DISPOSITION AND DISCUSSIONS  I have discussed management of the patient with the following physicians and TE's: None    Discussion of management with other QHP or appropriate source(s): Pharmacy for consultation of medicationswith limited options will initiate treatment to include epinephrine and FFP.     55-year-old male presenting with above presentation.  Unfortunately after I completed my initial H&P, consulted with pharmacy and ordered medications the nursing  staff went to the patient's room for medication administration and the patient had eloped.  Neither myself nor the nursing staff were able to have further conversation with the patient with regards to concern of his presentation and likely etiologies.  I was unable to inform him to stop his blood pressure medication.    FINAL DIAGNOSIS  Facial swelling     Electronically signed by: Franklyn Johnson M.D., 7/20/2025 1:38 AM           [1] No Known Allergies

## 2025-07-20 NOTE — DISCHARGE INSTRUCTIONS
Stop taking Lotrel (amlodipine-Benzepril), we are concerned Benzepril caused the swelling in your face today.  You have been switched to amlodipine at increased strength to control your blood pressure.  Please keep a daily log of your blood pressures and follow-up with your primary doctor for recheck as soon as possible.  Return to the Emergency Department if you change your mind about further treatment, or if swelling worsens and you develop difficulty breathing or swallowing.

## 2025-07-20 NOTE — ED NOTES
Pt ambulating in room, call light within reach. Pt updated on POC. No other needs noted at this time from pt.

## 2025-07-20 NOTE — ED NOTES
Pt ambulated to green 23 with steady gait. Pt placed on continuous monitoring. Pt speaking in full sentences. Airway patent. Pt aox4. Pt's chart up for ERP eval.

## 2025-07-20 NOTE — ED PROVIDER NOTES
ED Provider Note    CHIEF COMPLAINT  Chief Complaint   Patient presents with    Facial Swelling     Pt c/o facial swelling denies any difficulty breathing or swallowing       EXTERNAL RECORDS REVIEWED  Outpatient Notes primary care visit on May 27, 2025 was concerned that his blood pressure medication was causing him palpitations side effects.  At the time patient taking combination of amlodipine and benazepril    HPI/ROS  LIMITATION TO HISTORY   Select: : None  OUTSIDE HISTORIAN(S):  None    Cuate Bravo is a 55 y.o. male who presents with ongoing lip and lower facial edema.  He started on blood pressure medication 5 months ago, combination amlodipine and BenzePril.  He denies difficulty swallowing or breathing.  He states the swelling is slightly improved.  Plan this morning was treatment with fresh frozen plasma and adrenaline.  He states he had a family emergency and had to leave, now returns.  No chest pain.  He denies family history of swelling.  He denies similar events in the past    PAST MEDICAL HISTORY   Hypertension    SURGICAL HISTORY  patient denies any surgical history    FAMILY HISTORY  Family History   Problem Relation Age of Onset    Other Mother         unknown    Seizures Father        SOCIAL HISTORY  Social History     Tobacco Use    Smoking status: Some Days     Current packs/day: 0.25     Types: Cigarettes    Smokeless tobacco: Never   Vaping Use    Vaping status: Never Used   Substance and Sexual Activity    Alcohol use: Yes     Alcohol/week: 3.0 oz     Types: 1 Glasses of wine, 1 Cans of beer, 3 Shots of liquor per week     Comment: weekly    Drug use: No    Sexual activity: Not on file       CURRENT MEDICATIONS  Home Medications       Reviewed by Vivian Nance R.N. (Registered Nurse) on 07/20/25 at 0781  Med List Status: <None>     Medication Last Dose Status   amlodipine-benazepril (LOTREL) 5-10 MG per capsule  Active   nicotine (NICODERM) 7 MG/24HR PATCH 24 HR  Active   nicotine  polacrilex (NICORETTE) 2 MG Gum  Active   omeprazole (PRILOSEC) 20 MG delayed-release capsule  Active                    ALLERGIES  Allergies[1]    PHYSICAL EXAM  VITAL SIGNS: BP (!) 143/92   Pulse 94   Temp 36.5 °C (97.7 °F) (Temporal)   Resp 16   Wt 91 kg (200 lb 9.9 oz)   SpO2 93%   BMI 27.98 kg/m²    ENT: Angioedema greatest in the lower lip.  There is also anterior mandibular region swelling.  Within the mouth, no tongue swelling.  Respiratory: Clear breath sounds, no wheezing.  No stridor  Neurologic: Speech is clear.  Facial expression symmetric.  Strength normal  Psychiatric: Normal mood  Cardiac: Regular rate, regular rhythm          COURSE & MEDICAL DECISION MAKING    ASSESSMENT, COURSE AND PLAN  Care Narrative: Patient eloped this morning waiting for FFP treatment for ACE inhibitor induced angioedema.  He returns stating his swelling feels improved.  Initially was willing to wait for FFP, later again changed his mind and has left AGAINST MEDICAL ADVICE.  The patient's angioedema is likely secondary to ACE inhibitor's.  Cannot however rule out allergic reaction therefore patient prescribed antihistamines and steroid.  I have instructed the patient to stop taking his benazepril, will increase his amlodipine to 10 mg (up from 5 mg).  He is placed on prednisone, Benadryl, Pepcid.  He is advised follow-up with primary doctor for recheck as soon as possible, to return to the Emergency Department for any worsening of symptoms or if he changes his mind about further evaluation and treatment.      DISPOSITION AND DISCUSSIONS    Barriers to care at this time, including but not limited to: Patient continues to leave, twice today, prior to treatment of his angioedema.     Decision tools and prescription drugs considered including, but not limited to: Medication modification patient's blood pressure medicines have been modified secondary to concern over ACE inhibitor induced angioedema.    FINAL DIAGNOSIS  No  diagnosis found.     Electronically signed by: Laith Bhatia M.D., 7/20/2025 8:50 AM           [1] No Known Allergies

## 2025-07-20 NOTE — ED NOTES
Went into Pt's room to medicate pt and go over consent form for FFP's. Pt no where to be seen. Pt's IV found in trash. Pt did not notify staff about leaving. RICKY Johnson notified.

## 2025-07-20 NOTE — ED TRIAGE NOTES
"Chief Complaint   Patient presents with    Facial Swelling     Pt woke up with facial swelling since 1900. Drank about three shots of vodka prior to taking his nap this afternoon at 1400. Denies any food or any other allergies. Denies any pain, itching, SOB, or throat tightness. Pt states he did not eat any food today besides the 3 shots of vodka; which pt drinks often. Respirations even and unlabored. Speaking in clear and complete sentences. NAD.            Pt is alert and oriented, speaking in full sentences, follows commands and responds appropriately to questions. NAD. Resp are even and unlabored.      Pt placed in lobby. Pt educated on triage process. Pt encouraged to alert staff for any changes.     Patient and staff wearing appropriate PPE    BP (!) 131/101   Pulse (!) 117   Temp 36 °C (96.8 °F) (Tympanic)   Resp 18   Ht 1.803 m (5' 11\")   Wt 90.2 kg (198 lb 13.7 oz)   SpO2 96%   BMI 27.73 kg/m²     "

## 2025-08-20 ENCOUNTER — OFFICE VISIT (OUTPATIENT)
Dept: MEDICAL GROUP | Facility: MEDICAL CENTER | Age: 55
End: 2025-08-20
Payer: COMMERCIAL

## 2025-08-20 VITALS
SYSTOLIC BLOOD PRESSURE: 144 MMHG | OXYGEN SATURATION: 98 % | HEART RATE: 77 BPM | DIASTOLIC BLOOD PRESSURE: 80 MMHG | WEIGHT: 202.1 LBS | BODY MASS INDEX: 28.29 KG/M2 | HEIGHT: 71 IN | TEMPERATURE: 97.5 F

## 2025-08-20 DIAGNOSIS — Z12.11 COLON CANCER SCREENING: ICD-10-CM

## 2025-08-20 DIAGNOSIS — H60.41 CHOLESTEATOMA OF RIGHT EXTERNAL AUDITORY CANAL: ICD-10-CM

## 2025-08-20 DIAGNOSIS — I10 PRIMARY HYPERTENSION: Primary | ICD-10-CM

## 2025-08-20 DIAGNOSIS — E78.2 MIXED HYPERLIPIDEMIA: ICD-10-CM

## 2025-08-20 PROBLEM — R25.1 TREMORS OF NERVOUS SYSTEM: Status: ACTIVE | Noted: 2025-08-20

## 2025-08-20 PROCEDURE — 3077F SYST BP >= 140 MM HG: CPT | Performed by: STUDENT IN AN ORGANIZED HEALTH CARE EDUCATION/TRAINING PROGRAM

## 2025-08-20 PROCEDURE — 3079F DIAST BP 80-89 MM HG: CPT | Performed by: STUDENT IN AN ORGANIZED HEALTH CARE EDUCATION/TRAINING PROGRAM

## 2025-08-20 PROCEDURE — 99214 OFFICE O/P EST MOD 30 MIN: CPT | Performed by: STUDENT IN AN ORGANIZED HEALTH CARE EDUCATION/TRAINING PROGRAM

## 2025-08-20 RX ORDER — AMLODIPINE BESYLATE 10 MG/1
10 TABLET ORAL DAILY
Qty: 90 TABLET | Refills: 2 | Status: SHIPPED | OUTPATIENT
Start: 2025-08-20

## 2025-08-24 LAB
COMPONENT F 8504F: NORMAL
COMPONENT F 8504F: NORMAL

## 2025-08-31 PROBLEM — E78.2 MIXED HYPERLIPIDEMIA: Status: ACTIVE | Noted: 2025-08-31
